# Patient Record
Sex: MALE | Race: WHITE | Employment: OTHER | URBAN - METROPOLITAN AREA
[De-identification: names, ages, dates, MRNs, and addresses within clinical notes are randomized per-mention and may not be internally consistent; named-entity substitution may affect disease eponyms.]

---

## 2017-01-01 ENCOUNTER — MEDICAL CORRESPONDENCE (OUTPATIENT)
Dept: HEALTH INFORMATION MANAGEMENT | Facility: CLINIC | Age: 77
End: 2017-01-01

## 2017-01-01 ENCOUNTER — TELEPHONE (OUTPATIENT)
Dept: FAMILY MEDICINE | Facility: OTHER | Age: 77
End: 2017-01-01

## 2017-01-01 ENCOUNTER — TELEPHONE (OUTPATIENT)
Dept: PULMONOLOGY | Facility: CLINIC | Age: 77
End: 2017-01-01

## 2017-01-01 DIAGNOSIS — E78.5 HYPERLIPIDEMIA LDL GOAL <100: ICD-10-CM

## 2017-01-01 DIAGNOSIS — R30.0 DYSURIA: ICD-10-CM

## 2017-01-01 DIAGNOSIS — N18.30 CKD (CHRONIC KIDNEY DISEASE) STAGE 3, GFR 30-59 ML/MIN (H): ICD-10-CM

## 2017-01-01 DIAGNOSIS — N40.0 HYPERTROPHY OF PROSTATE WITHOUT URINARY OBSTRUCTION: ICD-10-CM

## 2017-01-01 DIAGNOSIS — I50.9 CONGESTIVE HEART FAILURE, UNSPECIFIED CONGESTIVE HEART FAILURE CHRONICITY, UNSPECIFIED CONGESTIVE HEART FAILURE TYPE: ICD-10-CM

## 2017-01-01 DIAGNOSIS — J44.9 CHRONIC OBSTRUCTIVE PULMONARY DISEASE, UNSPECIFIED COPD TYPE (H): ICD-10-CM

## 2017-01-01 DIAGNOSIS — R30.0 DYSURIA: Primary | ICD-10-CM

## 2017-01-01 DIAGNOSIS — I10 HYPERTENSION GOAL BP (BLOOD PRESSURE) < 140/90: ICD-10-CM

## 2017-01-01 LAB
ALBUMIN UR-MCNC: 30 MG/DL
APPEARANCE UR: CLEAR
BACTERIA #/AREA URNS HPF: ABNORMAL /HPF
BILIRUB UR QL STRIP: NEGATIVE
COLOR UR AUTO: YELLOW
GLUCOSE UR STRIP-MCNC: NEGATIVE MG/DL
HGB UR QL STRIP: ABNORMAL
KETONES UR STRIP-MCNC: NEGATIVE MG/DL
LEUKOCYTE ESTERASE UR QL STRIP: ABNORMAL
NITRATE UR QL: NEGATIVE
PH UR STRIP: 7 PH (ref 5–7)
RBC #/AREA URNS AUTO: ABNORMAL /HPF
SOURCE: ABNORMAL
SP GR UR STRIP: 1.02 (ref 1–1.03)
UROBILINOGEN UR STRIP-ACNC: 0.2 EU/DL (ref 0.2–1)
WBC #/AREA URNS AUTO: ABNORMAL /HPF

## 2017-01-01 PROCEDURE — 81001 URINALYSIS AUTO W/SCOPE: CPT | Performed by: INTERNAL MEDICINE

## 2017-01-11 ENCOUNTER — TELEPHONE (OUTPATIENT)
Dept: PULMONOLOGY | Facility: CLINIC | Age: 77
End: 2017-01-11

## 2017-01-11 DIAGNOSIS — J44.9 CHRONIC OBSTRUCTIVE PULMONARY DISEASE, UNSPECIFIED COPD TYPE (H): Primary | ICD-10-CM

## 2017-01-11 RX ORDER — BUDESONIDE 1 MG/2ML
1 INHALANT ORAL 2 TIMES DAILY
Qty: 60 AMPULE | Refills: 3 | Status: SHIPPED
Start: 2017-01-11 | End: 2017-01-17

## 2017-01-11 NOTE — TELEPHONE ENCOUNTER
Reason for Call:  Medication or medication refill:    Do you use a [facility]?  Name of the pharmacy and phone number for the current request[facility]    Name of the medication requested: budesonide (PULMICORT) 1 MG/2ML SUSP nebulizer solution     Other request: Pt calling and stated that [facility] have requested this multiple times and no response pt is needing this approved today please [facility] per pt per [facility] someone from the office should call. Thank You Fallon    Can we leave a detailed message on this number? YES    Phone number patient can be reached at: Cell number on file:    Telephone Information:   Mobile [phone]       Best Time: any    Call taken on 1/11/2017 at 8:30 AM by Fallon Carter

## 2017-01-11 NOTE — TELEPHONE ENCOUNTER
I have spoken with pt twice today. I have spoken to 2 different pharmacists at [facility] The last pharmacist wanted me to call the PA department. I have spoken to two different people from that department.  The original paper faxed communication from [facility] states that pt's insurance will not cover Pulmicort BID only QD. The first person I talked to in PA department did not say that pt needed a PA done. When I called the second person, they said that a PA could be done over the phone. They were given the COPD diagnosis, reasons for PA to exceed dose limit were no other medication to replace Pulmicort/pt does not tolerate daily dosing/pt does not tolerate less concentrated dose. The PA was denied for the 360ml quantity, that is for BID dosing. Pt and MD will receive a letter about this denial and the appeal process.  This will be brought to Dr. Goetz's attention on the 13th when he is here. ROVERTO Loaiza

## 2017-01-11 NOTE — TELEPHONE ENCOUNTER
PT needs refill of the Pulmicort neb. His insurance will only pay for 1 vial per day of the 1mg/2ml strength; they will only pay for 2 vials per day of the 0.5mg strength. I spoke to the [facility] pharmacist about this.  Pt tried the 0.5mg strength dose last fall and did not well with this.  Pt has difficulty understanding that the amt per day is the problem with the strength he is on. States he has not tried any other neb meds and that he needs the steroid!  I told pt that I would contact Dr. Goetz today. The refill has to be for a 90 day supply. ROVERTO Loaiza

## 2017-01-13 NOTE — TELEPHONE ENCOUNTER
Per Dr. Goetz he has signed the prescription for this medication and since Alicia has done the PA and it was denied there is not much else we can do from our end.    1) [facility] can [facility] and verify that they received the script.  2) Wesee if they will let him pay for the additional medication.  3) We can see if [facility] PA department was correct in thinking he could obtain the full script monthly and let them contact him with that option.  4) [facility]    I will attempt to contact [facility] today with these questions.

## 2017-01-13 NOTE — TELEPHONE ENCOUNTER
Called [facility], Spoke with Vika then with Jeff GARRIDO who is a pharmacist, to ask questions below  1) They do have the script on file from 1/11/17  2) They do not allow for cash payment from them.  They can send out the 90 day quantity.  It is possible that he could get a second script from us and cash pay for it at a local pharmacy.  3) The PA department must have mis-spoke when they informed Alicia that he could get 120 ml of the 1mg/2ml dosage.  The plan limits to # 30 ampule (60 ml) in 30 days for the 1mg/2 ml dose.  Each lower dose is limited so that the max dose per day is 1mg of Pulmicort per day.  4) [facility].    I have attempted to contact this patient by phone with the following results: no answer.

## 2017-01-16 NOTE — TELEPHONE ENCOUNTER
Spoke to patient and he states that he only has enough of his medication to get him through the next 5 days. He states that he needs to take this 2 times at day because by 3:00pm he can't breath. [place] try to have them fill the script for him. He also wonders if there is anything else that he can take? Patient doesn't seem to understand that it is his insurance that will not cover this medication and that we have done all that we can do on our end.

## 2017-01-16 NOTE — TELEPHONE ENCOUNTER
I have asked staff to page Dr. Goetz and ask him to call Ion to see if there is any other options for him.

## 2017-01-17 DIAGNOSIS — J43.2 CENTRILOBULAR EMPHYSEMA (H): Primary | ICD-10-CM

## 2017-01-17 RX ORDER — BUDESONIDE 0.5 MG/2ML
0.5 INHALANT ORAL DAILY
Qty: 30 AMPULE | Refills: 6 | Status: SHIPPED | OUTPATIENT
Start: 2017-01-17 | End: 2017-04-17

## 2017-01-17 NOTE — PROGRESS NOTES
Patient called and informed that insurance will not pay for pulmicort 1 mg BID dosing. I offered alternative of 0.5 mg BID plus an oral prednisone dose of 5-10 mg which shouldn't have much adverse effects.  He'll try the lower dose without the extra prednisone unless he needs it.  He's currently in [place] for the winter and is doing well in the warmer weather.

## 2017-01-18 ENCOUNTER — TELEPHONE (OUTPATIENT)
Dept: OTOLARYNGOLOGY | Facility: CLINIC | Age: 77
End: 2017-01-18

## 2017-01-18 NOTE — TELEPHONE ENCOUNTER
Reason for Call:  Medication or medication refill:    Do you use a [facility]?  Name of the pharmacy and phone [facility] for the [facility]    Name of the medication requested: medication you spoke with him about yesterday.    Other request: he says it is still prescription is still incorrect and would like a phone call to get it right.    Can we leave a detailed message on this number? YES    Phone number patient can be reached at: Cell number on file:    Telephone Information:   Mobile [phone]       Best Time: asap    Call taken on 1/18/2017 at 8:48 AM by Rosenda Jameson

## 2017-01-19 NOTE — TELEPHONE ENCOUNTER
Patient called again today.  Asked to speak with Lakesha.  States insurance will only pay for medication once a day and Dr Goetz has prescribed twice a day. PULMICORT

## 2017-01-24 NOTE — TELEPHONE ENCOUNTER
The Dr. Goetz placed a new prescription in for Pulmicort 0.5 mg twice a day after speaking with Ion on 1/17/17 this was sent to [facility] per Ion's request.  I will attempt to contact [facility] to verify this information today.   Dr. Goetz is here again on 1/27/17.

## 2017-01-27 NOTE — TELEPHONE ENCOUNTER
[facility]. They needed the dosage and how to dispense prescription. Writer gave the prescription information and script can be filled now. Attempted to call patient to inform him of this and the phone number has been disconnected.  Je HERNANDEZ CMA

## 2017-04-12 ENCOUNTER — TELEPHONE (OUTPATIENT)
Dept: FAMILY MEDICINE | Facility: OTHER | Age: 77
End: 2017-04-12

## 2017-04-12 NOTE — TELEPHONE ENCOUNTER
Reason for Call:  Medication or medication refill:    Do you use a [facility]?  Name of the pharmacy and phone number for the [facility]    Name of the medication requested: He just got back to [place] and has not been here since November. He would like a call back from Dr. Fontenot's nurse to update his medications.     Other request:     Can we leave a detailed message on this number? Not Applicable    Phone number patient can be reached at: Home number on file [phone] (home)    Best Time: any    Call taken on 4/12/2017 at 2:37 PM by Johanne Gill

## 2017-04-12 NOTE — TELEPHONE ENCOUNTER
Mercedes stopped at the clinic and dropped off a list and it has been placed in Dr Fontenot's box.  He needs his prescriptions to go to [facility].  She was notified that Dr GARRIDO is out of clinic until 4/18/17, she will notify Rupert of this and will have him call if he needs anything urgently.    Thank you,  Ana TREVINO

## 2017-04-26 ENCOUNTER — OFFICE VISIT (OUTPATIENT)
Dept: FAMILY MEDICINE | Facility: OTHER | Age: 77
End: 2017-04-26
Payer: MEDICARE

## 2017-04-26 VITALS
HEART RATE: 88 BPM | SYSTOLIC BLOOD PRESSURE: 122 MMHG | OXYGEN SATURATION: 99 % | RESPIRATION RATE: 26 BRPM | BODY MASS INDEX: 22.53 KG/M2 | WEIGHT: 135.2 LBS | DIASTOLIC BLOOD PRESSURE: 82 MMHG | TEMPERATURE: 97.8 F | HEIGHT: 65 IN

## 2017-04-26 DIAGNOSIS — N18.30 CKD (CHRONIC KIDNEY DISEASE) STAGE 3, GFR 30-59 ML/MIN (H): ICD-10-CM

## 2017-04-26 DIAGNOSIS — I10 HYPERTENSION GOAL BP (BLOOD PRESSURE) < 140/90: ICD-10-CM

## 2017-04-26 DIAGNOSIS — F41.9 ANXIETY: ICD-10-CM

## 2017-04-26 DIAGNOSIS — I50.9 CONGESTIVE HEART FAILURE, UNSPECIFIED CONGESTIVE HEART FAILURE CHRONICITY, UNSPECIFIED CONGESTIVE HEART FAILURE TYPE: ICD-10-CM

## 2017-04-26 DIAGNOSIS — J44.9 CHRONIC OBSTRUCTIVE PULMONARY DISEASE, UNSPECIFIED COPD TYPE (H): ICD-10-CM

## 2017-04-26 DIAGNOSIS — J43.9 EMPHYSEMATOUS BLEB (H): ICD-10-CM

## 2017-04-26 DIAGNOSIS — Z13.29 SCREENING FOR THYROID DISORDER: Primary | ICD-10-CM

## 2017-04-26 DIAGNOSIS — N40.0 HYPERTROPHY OF PROSTATE WITHOUT URINARY OBSTRUCTION: ICD-10-CM

## 2017-04-26 DIAGNOSIS — E78.5 HYPERLIPIDEMIA LDL GOAL <100: ICD-10-CM

## 2017-04-26 LAB
ALBUMIN SERPL-MCNC: 4 G/DL (ref 3.4–5)
ALP SERPL-CCNC: 87 U/L (ref 40–150)
ALT SERPL W P-5'-P-CCNC: 21 U/L (ref 0–70)
ANION GAP SERPL CALCULATED.3IONS-SCNC: 8 MMOL/L (ref 3–14)
AST SERPL W P-5'-P-CCNC: 18 U/L (ref 0–45)
BILIRUB SERPL-MCNC: 0.5 MG/DL (ref 0.2–1.3)
BUN SERPL-MCNC: 31 MG/DL (ref 7–30)
CALCIUM SERPL-MCNC: 9.1 MG/DL (ref 8.5–10.1)
CHLORIDE SERPL-SCNC: 109 MMOL/L (ref 94–109)
CHOLEST SERPL-MCNC: 141 MG/DL
CO2 SERPL-SCNC: 28 MMOL/L (ref 20–32)
CREAT SERPL-MCNC: 1.28 MG/DL (ref 0.66–1.25)
CREAT UR-MCNC: 115 MG/DL
GFR SERPL CREATININE-BSD FRML MDRD: 54 ML/MIN/1.7M2
GLUCOSE SERPL-MCNC: 90 MG/DL (ref 70–99)
HDLC SERPL-MCNC: 57 MG/DL
LDLC SERPL CALC-MCNC: 72 MG/DL
MICROALBUMIN UR-MCNC: 204 MG/L
MICROALBUMIN/CREAT UR: 177.39 MG/G CR (ref 0–17)
NONHDLC SERPL-MCNC: 84 MG/DL
POTASSIUM SERPL-SCNC: 4.4 MMOL/L (ref 3.4–5.3)
PROT SERPL-MCNC: 7.3 G/DL (ref 6.8–8.8)
SODIUM SERPL-SCNC: 145 MMOL/L (ref 133–144)
TRIGL SERPL-MCNC: 58 MG/DL
TSH SERPL DL<=0.05 MIU/L-ACNC: 1.66 MU/L (ref 0.4–4)

## 2017-04-26 PROCEDURE — 82043 UR ALBUMIN QUANTITATIVE: CPT | Performed by: FAMILY MEDICINE

## 2017-04-26 PROCEDURE — 80053 COMPREHEN METABOLIC PANEL: CPT | Performed by: FAMILY MEDICINE

## 2017-04-26 PROCEDURE — 36415 COLL VENOUS BLD VENIPUNCTURE: CPT | Performed by: FAMILY MEDICINE

## 2017-04-26 PROCEDURE — 84443 ASSAY THYROID STIM HORMONE: CPT | Performed by: FAMILY MEDICINE

## 2017-04-26 PROCEDURE — 99214 OFFICE O/P EST MOD 30 MIN: CPT | Performed by: FAMILY MEDICINE

## 2017-04-26 PROCEDURE — 80061 LIPID PANEL: CPT | Performed by: FAMILY MEDICINE

## 2017-04-26 RX ORDER — LISINOPRIL 2.5 MG/1
2.5 TABLET ORAL 2 TIMES DAILY
Qty: 180 TABLET | Refills: 3 | Status: CANCELLED | OUTPATIENT
Start: 2017-04-26

## 2017-04-26 RX ORDER — TERAZOSIN 5 MG/1
CAPSULE ORAL
Qty: 90 CAPSULE | Refills: 3 | Status: SHIPPED | OUTPATIENT
Start: 2017-04-26 | End: 2017-07-31 | Stop reason: DRUGHIGH

## 2017-04-26 RX ORDER — LISINOPRIL 2.5 MG/1
2.5 TABLET ORAL DAILY
COMMUNITY
Start: 2016-09-14 | End: 2017-04-26

## 2017-04-26 RX ORDER — LISINOPRIL 5 MG/1
5 TABLET ORAL DAILY
Qty: 90 TABLET | Refills: 3 | Status: SHIPPED | OUTPATIENT
Start: 2017-04-26 | End: 2017-05-02

## 2017-04-26 RX ORDER — ARFORMOTEROL TARTRATE 15 UG/2ML
15 SOLUTION RESPIRATORY (INHALATION) 2 TIMES DAILY
COMMUNITY
Start: 2017-03-22 | End: 2017-04-26

## 2017-04-26 RX ORDER — BUDESONIDE 1 MG/2ML
1 INHALANT ORAL 2 TIMES DAILY
Qty: 360 ML | Refills: 3 | Status: SHIPPED | OUTPATIENT
Start: 2017-04-26

## 2017-04-26 RX ORDER — ATORVASTATIN CALCIUM 10 MG/1
10 TABLET, FILM COATED ORAL DAILY
Qty: 90 TABLET | Refills: 3 | Status: SHIPPED | OUTPATIENT
Start: 2017-04-26 | End: 2018-01-01

## 2017-04-26 RX ORDER — DILTIAZEM HYDROCHLORIDE 120 MG/1
120 TABLET, FILM COATED ORAL DAILY
Qty: 90 TABLET | Refills: 3 | Status: SHIPPED | OUTPATIENT
Start: 2017-04-26

## 2017-04-26 RX ORDER — ALPRAZOLAM 0.25 MG
0.25 TABLET ORAL 3 TIMES DAILY PRN
Qty: 90 TABLET | Refills: 0 | Status: SHIPPED | OUTPATIENT
Start: 2017-04-26

## 2017-04-26 RX ORDER — TIOTROPIUM BROMIDE 18 UG/1
CAPSULE ORAL; RESPIRATORY (INHALATION)
Qty: 90 CAPSULE | Refills: 3 | Status: SHIPPED | OUTPATIENT
Start: 2017-04-26

## 2017-04-26 RX ORDER — ARFORMOTEROL TARTRATE 15 UG/2ML
15 SOLUTION RESPIRATORY (INHALATION) 2 TIMES DAILY
Qty: 120 ML | Refills: 11 | Status: SHIPPED | OUTPATIENT
Start: 2017-04-26

## 2017-04-26 RX ORDER — LISINOPRIL 2.5 MG/1
2.5 TABLET ORAL DAILY
Qty: 90 TABLET | Refills: 3 | Status: SHIPPED | OUTPATIENT
Start: 2017-04-26 | End: 2017-05-02

## 2017-04-26 RX ORDER — SILDENAFIL 100 MG/1
100 TABLET, FILM COATED ORAL DAILY PRN
Qty: 10 TABLET | Refills: 11 | Status: SHIPPED | OUTPATIENT
Start: 2017-04-26 | End: 2018-01-01

## 2017-04-26 ASSESSMENT — PAIN SCALES - GENERAL: PAINLEVEL: NO PAIN (0)

## 2017-04-26 NOTE — LETTER
[facility]  [address] [facility] [address]  Phone: [phone]          April 26, 2017    Ion Johnson  [address] [place] [address]          Dear Ion,      LAB RESULTS:     The results of your recent labs were normal or have improved.  If you have any further questions or problems, please contact our office.          Sincerely,      MELISSA Fontenot M.D.

## 2017-04-26 NOTE — PROGRESS NOTES
"SUBJECTIVE:                                                    Ion Johnson is a 77 year old male who presents to clinic today for the following health issues:    Hyperlipidemia Follow-Up    Rate your low fat/cholesterol diet?: not monitoring fat    Taking statin?  Yes, no muscle aches from statin    Other lipid medications/supplements?:  none     Hypertension Follow-up    Outpatient blood pressures are being checked at home.  Results are good    Low Salt Diet: not monitoring salt     COPD Follow-Up    Symptoms are currently: slightly worsened    Current fatigue or dyspnea with ambulation: worsened from baseline    Shortness of breath: slightly worsened    Increased or change in Cough/Sputum: No    Fever(s): No    Baseline ambulation without stopping to rest 100 feet. Able to walk up 1 flights of stairs without stopping to rest.    Any ER/UC or hospital admissions since your last visit? No     History   Smoking Status     Former Smoker     Packs/day: 1.00     Years: 50.00     Quit date: 8/24/2007   Smokeless Tobacco     Never Used     Comment: Quit      No results found for: FEV1, NTY1PBT       Amount of exercise or physical activity: None    Problems taking medications regularly: No    Medication side effects: none    Diet: regular (no restrictions)      Problem list and histories reviewed & adjusted, as indicated.    C: NEGATIVE for fever, chills, change in weight  E/M: NEGATIVE for ear, mouth and throat problems  RESP:Hx COPD and SOB/dyspnea  CV: NEGATIVE for chest pain, palpitations or peripheral edema    OBJECTIVE:                                                    /82 (BP Location: Left arm, Cuff Size: Adult Regular)  Pulse 88  Temp 97.8  F (36.6  C) (Tympanic)  Resp 26  Ht 5' 5\" (1.651 m)  Wt 135 lb 3.2 oz (61.3 kg)  SpO2 99%  BMI 22.5 kg/m2  Body mass index is 22.5 kg/(m^2).    See dictated note     ASSESSMENT/PLAN:                                                        Scar Fontenot, " MD, MD  [facility]

## 2017-04-26 NOTE — NURSING NOTE
"Chief Complaint   Patient presents with     Hyperlipidemia     COPD     Hypertension       Initial /82 (BP Location: Left arm, Cuff Size: Adult Regular)  Pulse 88  Temp 97.8  F (36.6  C) (Tympanic)  Resp 26  Ht 5' 5\" (1.651 m)  Wt 135 lb 3.2 oz (61.3 kg)  SpO2 99%  BMI 22.5 kg/m2 Estimated body mass index is 22.5 kg/(m^2) as calculated from the following:    Height as of this encounter: 5' 5\" (1.651 m).    Weight as of this encounter: 135 lb 3.2 oz (61.3 kg).  Medication Reconciliation: complete   Michelle Avila MA     4/26/2017      "

## 2017-04-26 NOTE — MR AVS SNAPSHOT
"              After Visit Summary   4/26/2017    Ion Johnson    MRN: [id number]           Patient Information     Date Of Birth          1940        Visit Information        Provider Department      4/26/2017 8:00 AM Scar Fontenot MD [facility]        Today's Diagnoses     Screening for thyroid disorder    -  1    Hyperlipidemia LDL goal <100        Hypertension goal BP (blood pressure) < 140/90        Chronic obstructive pulmonary disease, unspecified COPD type (H)        Hypertrophy of prostate without urinary obstruction        Congestive heart failure, unspecified congestive heart failure chronicity, unspecified congestive heart failure type (H)        CKD (chronic kidney disease) stage 3, GFR 30-59 ml/min        Anxiety           Follow-ups after your visit        Who to contact     If you have questions or need follow up information about today's clinic visit or your schedule please contact [facility] directly at [phone].  Normal or non-critical lab and imaging results will be communicated to you by MyChart, letter or phone within 4 business days after the clinic has received the results. If you do not hear from us within 7 days, please contact the clinic through [facility] or phone. If you have a critical or abnormal lab result, we will notify you by phone as soon as possible.  Submit refill requests through [facility]n or call your pharmacy and they will forward the refill request to us. Please allow 3 business days for your refill to be completed.          Additional Information About Your Visit        MyChart Information     Rayn lets you send messages to your doctor, view your test results, renew your prescriptions, schedule appointments and more. To sign up, go to [email or web address] . Click on \"Log in\" on the left side of the screen, which will take you to the Welcome page. Then click on \"Sign up Now\" on the right side of the page.     You will be asked " "to enter the access code listed below, as well as some personal information. Please follow the directions to create your username and password.     Your access code is: [email or web address]  Expires: 2017  9:36 AM     Your access code will  in 90 days. If you need help or a new code, please call your [facility] clinic or [phone].        Care EveryWhere ID     This is your Care EveryWhere ID. This could be used by other organizations to access your [facility] medical records  [id number]        Your Vitals Were     Pulse Temperature Respirations Height Pulse Oximetry BMI (Body Mass Index)    88 97.8  F (36.6  C) (Tympanic) 26 5' 5\" (1.651 m) 99% 22.5 kg/m2       Blood Pressure from Last 3 Encounters:   17 122/82   16 118/70   16 152/82    Weight from Last 3 Encounters:   17 135 lb 3.2 oz (61.3 kg)   16 123 lb 1.6 oz (55.8 kg)   16 125 lb 9.6 oz (57 kg)              We Performed the Following     Albumin Random Urine Quantitative     Comprehensive metabolic panel     Lipid Profile with reflex to direct LDL     TSH          Today's Medication Changes          These changes are accurate as of: 17 10:05 AM.  If you have any questions, ask your nurse or doctor.               Start taking these medicines.        Dose/Directions    ALPRAZolam 0.25 MG tablet   Commonly known as:  XANAX   Used for:  Anxiety   Started by:  Scar Fontenot MD        Dose:  0.25 mg   Take 1 tablet (0.25 mg) by mouth 3 times daily as needed for anxiety   Quantity:  90 tablet   Refills:  0       * lisinopril 2.5 MG tablet   Commonly known as:  PRINIVIL/Zestril   Used for:  Hypertension goal BP (blood pressure) < 140/90   Started by:  Scar Fontenot MD        Dose:  2.5 mg   Take 1 tablet (2.5 mg) by mouth daily To take along with lisinopril 5 mg for total of lisinopril 7.5 mg twice daily   Quantity:  90 tablet   Refills:  3       * lisinopril 5 MG tablet   Commonly known as:  " PRINIVIL/ZESTRIL   Used for:  Hypertension goal BP (blood pressure) < 140/90   Started by:  Scar Fontenot MD        Dose:  5 mg   Take 1 tablet (5 mg) by mouth daily   Quantity:  90 tablet   Refills:  3       * Notice:  This list has 2 medication(s) that are the same as other medications prescribed for you. Read the directions carefully, and ask your doctor or other care provider to review them with you.         Where to get your medicines      These medications were sent to [facility] - [address] [address]     Phone:  [phone]     atorvastatin 10 MG tablet    Ipratropium-Albuterol  MCG/ACT inhaler    lisinopril 2.5 MG tablet    lisinopril 5 MG tablet    terazosin 5 MG capsule    tiotropium 18 MCG capsule         Some of these will need a paper prescription and others can be bought over the counter.  Ask your nurse if you have questions.     Bring a paper prescription for each of these medications     ALPRAZolam 0.25 MG tablet                Primary Care Provider Fax #    [facility] [phone]       [facility] [address]        Thank you!     Thank you for choosing [facility]  for your care. Our goal is always to provide you with excellent care. Hearing back from our patients is one way we can continue to improve our services. Please take a few minutes to complete the written survey that you may receive in the mail after your visit with us. Thank you!             Your Updated Medication List - Protect others around you: Learn how to safely use, store and throw away your medicines at www.disposemymeds.org.          This list is accurate as of: 4/26/17 10:06 AM.  Always use your most recent med list.                   Brand Name Dispense Instructions for use    ALPRAZolam 0.25 MG tablet    XANAX    90 tablet    Take 1 tablet (0.25 mg) by mouth 3 times daily as needed for  anxiety       atorvastatin 10 MG tablet    LIPITOR    90 tablet    Take 1 tablet (10 mg) by mouth daily       BROVANA 15 MCG/2ML Nebu neb solution   Generic drug:  arformoterol      Inhale 15 mcg into the lungs 2 times daily       budesonide 1 MG/2ML Susp neb solution    PULMICORT    360 mL    Take 2 mLs (1 mg) by nebulization 2 times daily       COQ-10 PO      Reported on 4/26/2017       diltiazem 120 MG tablet    CARDIZEM     1 tablet daily       * ipratropium - albuterol 0.5 mg/2.5 mg/3 mL 0.5-2.5 (3) MG/3ML neb solution    DUONEB    360 vial    USE 1 VIAL VIA NEBULIZER EVERY 4 HOURS AS NEEDED FOR SHORTNESS OF BREATH / DYSPNEA       * Ipratropium-Albuterol  MCG/ACT inhaler    COMBIVENT RESPIMAT    6 Inhaler    INHALE 1 PUFF INTO THE LUNGS FOUR TIMES A DAY. DO NOT EXCEED 6 DOSES PER DAY.       * lisinopril 2.5 MG tablet    PRINIVIL/Zestril    90 tablet    Take 1 tablet (2.5 mg) by mouth daily To take along with lisinopril 5 mg for total of lisinopril 7.5 mg twice daily       * lisinopril 5 MG tablet    PRINIVIL/ZESTRIL    90 tablet    Take 1 tablet (5 mg) by mouth daily       MULTI-VITAMIN PO      Reported on 4/26/2017       order for DME     1 each    Equipment being ordered: Portable Nebulizer machine (Omron Micro-Air Electronic Nebulizer System [id number]       order for DME     1 Device    Equipment being ordered: Oxygen with portability and all necessary accessories       sildenafil 100 MG cap/tab    VIAGRA    10 tablet    Take 1 tablet (100 mg) by mouth daily as needed       terazosin 5 MG capsule    HYTRIN    90 capsule    TAKE 1 CAPSULE AT BEDTIME       tiotropium 18 MCG capsule    SPIRIVA HANDIHALER    90 capsule    Inhale contents of one capsule daily.       * Notice:  This list has 4 medication(s) that are the same as other medications prescribed for you. Read the directions carefully, and ask your doctor or other care provider to review them with you.

## 2017-04-26 NOTE — PROGRESS NOTES
SUBJECTIVE:  Mr. Ion Johnson is a 77-year-old man who presents with his wife.        They have just returned from wintering in [place].  He has a problem with COPD, chronic lung problems followed by Pulmonology on pretty much everything for that.  Seems to think that that is stable or possibly even a little improved and that is his really only health complaint.  He does have some anxieties and needs periodic Xanax for that.  He needs refills on his medications and needs blood work today which is drawn.        I do give him refills.  Three months worth with a year's worth of refills except for the Xanax which is a scheduled drug and I explained that I suggest he gets that one locally and not get through the mail as they are not allowed to refill.  He seems to be upset about this, but I am adamant.      OBJECTIVE:    VITAL SIGNS:   On examination O2 sats now on room air are excellent at 99%.  Blood pressure is 122/82.  On his last visit here it had been very low and I suggested that he stop the lisinopril; however, he has not done so.  He continues to use that and without symptoms.  He did note when he used more lisinopril he got lightheaded.   LUNG:  Lungs today sound completely clear.   HEART:  Regular and no carotid bruits.      PLAN:    Will stay on current treatments.         TREMAINE NEGRETE M.D.             D: 2017 10:29   T: 2017 12:04   MT: BRYANT#[id number]      Name:     ION JOHNSON   MRN:      [id number]        Account:      [id number]   :      1940           Visit Date:   2017      Document: [id number]

## 2017-04-27 DIAGNOSIS — I10 HYPERTENSION GOAL BP (BLOOD PRESSURE) < 140/90: ICD-10-CM

## 2017-04-27 NOTE — TELEPHONE ENCOUNTER
Reason for Call:  Other prescription    Detailed comments: [facility] Pharmacist would like to speak to a nurse to verify both Lisinopril's.    Phone Number Patient can be reached at:     Best Time: anytime    Can we leave a detailed message on this number? YES    Call taken on 4/27/2017 at 11:49 AM by Nickie Goldsmith

## 2017-04-28 ENCOUNTER — TELEPHONE (OUTPATIENT)
Dept: PULMONOLOGY | Facility: CLINIC | Age: 77
End: 2017-04-28

## 2017-04-28 ENCOUNTER — TELEPHONE (OUTPATIENT)
Dept: FAMILY MEDICINE | Facility: OTHER | Age: 77
End: 2017-04-28

## 2017-04-28 NOTE — TELEPHONE ENCOUNTER
Patient called in with concerns about his Pulmicort Nebulizer solution. He states he spoke with [facility] and they told him he needed a PA in order to get the dosage and quanity he was requesting. He was upset and SOB when I spoke to him. He said he has enough to get him through for a while but wants to make sure when the new script comes that it is the right dosage. I reassured him that I would contact [facility] and speak with Dr. Goetz and try to get him an answer by the end of the day today. He also informed me [facility] but doesn't want to [facility] there anymore.    I spoke with [facility] and they said he has reached his maximum quantity for the medication and this is now an insurance issue he needs to resolve. They have no recollection of telling the patient he could get a PA for that dosage of 1MG/2ML. They reassured me that a refill will be sent out on 5/2/2017 and he will get the max mL he can have which is 0.5MG/2ML. They told me that they could not release what other refills he had in their system or what strength.     Dr. Goetz agrees with [facility] and said he has told him previously on more than one occasion what his other options were and he denied all. [facility] would [facility]. He has reached his maximum with us and is not allowed anymore.    Discussed matter with Dr. Goetz and we decided the best solution is to write the patient a letter considering his level of agitation on the phone. The letter will be placed in the mail by end of the day today, 4/28/2017.    Paulina Shaffer, [facility]

## 2017-04-28 NOTE — LETTER
[address] [address]  [phone]      April 28, 2017      Ion JUAN Johnson  [address] [place] [id number]        Dear Ion,    After speaking with [facility] about your medication for Pulmicort 1MG/2ML nebulizer solution, we regret to inform you that we are still unable to process this request. Per your insurance policy, you have a quantity limit of 30 units per month. According to your insurance policy you are only covered for 1 MG per day. It seems you have reached your quantity limit. You have [facility] you [facility] 1MG/2ML solution, if so, we suggest you move forward with them for this prescription. At this point, there is nothing further we can do for you regarding this matter. This is an insurance issue. Attached you will find the letter we received from [employer] that explains the situation in further detail, you also should have received this.  If you have any questions or further concerns please feel free to contact us.      Sincerely,        Shad Goetz M.D.

## 2017-05-02 PROBLEM — F41.9 ANXIETY: Status: ACTIVE | Noted: 2017-05-02

## 2017-05-02 RX ORDER — LISINOPRIL 2.5 MG/1
2.5 TABLET ORAL 2 TIMES DAILY
Qty: 180 TABLET | Refills: 3 | Status: SHIPPED | OUTPATIENT
Start: 2017-05-02 | End: 2018-01-01 | Stop reason: DRUGHIGH

## 2017-05-02 RX ORDER — LISINOPRIL 5 MG/1
5 TABLET ORAL 2 TIMES DAILY
Qty: 180 TABLET | Refills: 3 | Status: SHIPPED | OUTPATIENT
Start: 2017-05-02 | End: 2018-01-01

## 2017-05-16 ENCOUNTER — TELEPHONE (OUTPATIENT)
Dept: PULMONOLOGY | Facility: CLINIC | Age: 77
End: 2017-05-16

## 2017-05-16 NOTE — TELEPHONE ENCOUNTER
"Received fax johanne from [facility] with letter from patient requesting reconsideration of his Pulmicort medication. The appeal was denied. This fax seems to be more of an \"FYI\" purpose. They will be scanned and emailed to Dr. Goetz. Patient was informed this is an insurance issue at this point and \"our hands are tied\".    Paulina Shaffer CMA      "

## 2017-07-26 ENCOUNTER — TELEPHONE (OUTPATIENT)
Dept: PULMONOLOGY | Facility: CLINIC | Age: 77
End: 2017-07-26

## 2017-07-26 NOTE — TELEPHONE ENCOUNTER
None of his medications are given by Dr. Goetz. Also, weight loss issues would be more of an issue for his PCP. This is not geared for Pulmonology. Will forward to PCP for evaluation and appointment concerning weight loss.    Paulina Shaffer, CMA

## 2017-07-26 NOTE — TELEPHONE ENCOUNTER
Reason for Call:  Other call back    Detailed comments: Patient states he is having weight loss and believes it is due to the change in medications.  He has an appointment on August 25th but was hoping to speak with Dr Goetz or a nurse sooner if possible.      Phone Number Patient can be reached at: Home number on file [phone] (home)    Best Time: any    Can we leave a detailed message on this number? YES    Call taken on 7/26/2017 at 10:59 AM by Courtney Berg

## 2017-07-26 NOTE — TELEPHONE ENCOUNTER
": 1940  PHONE #'s: [phone] (home)     PRESENTING PROBLEM:  He is concerned that he has lost 18# since last year and down 2# from yesterday.     NURSING ASSESSMENT  Description:  \" I JUST CAN'T understand why I am losing weight?  I feel I eat well so wondering if it is my medication and what I can do about it?  I see Dr. Sue on 17.  Onset/duration:  This past year.   Precip. factors:  Hx of COPD taking Brovana, Pulmicort, Sprivia, Combivent- all of which  List side effect of upset stomach. \" I don't feel sick to my stomach though. \"   Assoc. Sx:  Some times will have a headache or feel trembling or nervous.   Improves/worsens Sx:  same  Pain scale (1-10)   0/10  Sx specific meds:  As listed above per pulmonologist.   Last exam/Tx:  17 saw Dr. Fontenot. [facility]  \" NO one can find out why I am losing weight. \"     RECOMMENDED DISPOSITION:  Schedule appt with Dr Fontenot for 17 and keep appt with Dr. Sue on 17.  Will comply with recommendation: YES  If further questions/concerns or if Sx do not improve, worsen or new Sx develop, call your PCP or [facility] as soon as possible.    NOTES:  Disposition was determined by the first positive assessment question, therefore all previous assessment questions were negative.  Informed to check provider manual or call insurance company to assure coverage.    Guideline used: Breathing problems. And Clinical References for the inhalers as listed above in the message.   Telephone Triage Protocols for Nurses, Fifth Edition, Ana Beltran RN  2017    "

## 2017-07-31 ENCOUNTER — OFFICE VISIT (OUTPATIENT)
Dept: FAMILY MEDICINE | Facility: OTHER | Age: 77
End: 2017-07-31
Payer: MEDICARE

## 2017-07-31 VITALS
HEART RATE: 60 BPM | BODY MASS INDEX: 20.39 KG/M2 | OXYGEN SATURATION: 94 % | SYSTOLIC BLOOD PRESSURE: 185 MMHG | HEIGHT: 65 IN | WEIGHT: 122.4 LBS | TEMPERATURE: 98 F | DIASTOLIC BLOOD PRESSURE: 96 MMHG | RESPIRATION RATE: 24 BRPM

## 2017-07-31 DIAGNOSIS — I49.9 IRREGULAR HEART RHYTHM: ICD-10-CM

## 2017-07-31 DIAGNOSIS — N40.0 HYPERTROPHY OF PROSTATE WITHOUT URINARY OBSTRUCTION: Primary | ICD-10-CM

## 2017-07-31 DIAGNOSIS — J44.9 CHRONIC OBSTRUCTIVE PULMONARY DISEASE, UNSPECIFIED COPD TYPE (H): ICD-10-CM

## 2017-07-31 LAB
ERYTHROCYTE [DISTWIDTH] IN BLOOD BY AUTOMATED COUNT: 12.6 % (ref 10–15)
HCT VFR BLD AUTO: 43.7 % (ref 40–53)
HGB BLD-MCNC: 14.7 G/DL (ref 13.3–17.7)
MCH RBC QN AUTO: 30.1 PG (ref 26.5–33)
MCHC RBC AUTO-ENTMCNC: 33.6 G/DL (ref 31.5–36.5)
MCV RBC AUTO: 89 FL (ref 78–100)
PLATELET # BLD AUTO: 152 10E9/L (ref 150–450)
RBC # BLD AUTO: 4.89 10E12/L (ref 4.4–5.9)
WBC # BLD AUTO: 7.7 10E9/L (ref 4–11)

## 2017-07-31 PROCEDURE — 85027 COMPLETE CBC AUTOMATED: CPT | Performed by: FAMILY MEDICINE

## 2017-07-31 PROCEDURE — 99213 OFFICE O/P EST LOW 20 MIN: CPT | Performed by: FAMILY MEDICINE

## 2017-07-31 PROCEDURE — 36415 COLL VENOUS BLD VENIPUNCTURE: CPT | Performed by: FAMILY MEDICINE

## 2017-07-31 PROCEDURE — 93000 ELECTROCARDIOGRAM COMPLETE: CPT | Performed by: FAMILY MEDICINE

## 2017-07-31 RX ORDER — TERAZOSIN 2 MG/1
2 CAPSULE ORAL AT BEDTIME
Qty: 30 CAPSULE | Refills: 1 | Status: SHIPPED | OUTPATIENT
Start: 2017-07-31

## 2017-07-31 ASSESSMENT — PAIN SCALES - GENERAL: PAINLEVEL: NO PAIN (0)

## 2017-07-31 NOTE — LETTER
My COPD Action Plan   Name: Ion Johnson    YOB: 1940   Date: 7/31/2017    My doctor: Scar Fontenot MD, MD   My clinic: [address] [address] [phone]  My Controller Medicine: Tiotropium (Spiriva)   Brovona  Budesonide (Pulmicort)        My Rescue Medicine: combivent        My Flare Up Medicine: Prednisone     My COPD Severity:     Use of Oxygen: 2-3 Liters continuously        GREEN ZONE       Doing well today      Usual level of activity and exercise    Usual amount of cough and mucus    No shortness of breath    Usual level of health (thinking clearly, sleeping well, feel like eating) Actions:      Take daily medicines    Use oxygen as prescribed    Follow regular exercise and diet plan    Avoid cigarette smoke and other irritants that harm the lungs           YELLOW ZONE          Having a bad day or flare up      Short of breath more than usual    A lot more sputum (mucus) than usual    Sputum looks yellow, green, tan, brown or bloody    More coughing or wheezing    Fever or chills    Less energy; trouble completing activities    Trouble thinking or focusing    Using quick relief inhaler or nebulizer more often    Poor sleep; symptoms wake me up    Do not feel like eating Actions:      Get plenty of rest    Take daily medicines    Use quick relief inhaler every 4  hoursas needed    If you use oxygen, call you doctor to see if you should adjust your oxygen    Do breathing exercises or other things to help you relax    Let a loved one, friend or neighbor know you are feeling worse    Call your care team if you have 2 or more symptoms.  Start taking steroids or antibiotics if directed by your care team           RED ZONE       Need medical care now      Severe shortness of breath (feel you can't breathe)    Fever, chills    Not enough breath to do any activity    Trouble coughing up mucus, walking or talking    Blood in mucus    Frequent  coughing   Rescue medicines are not working    Not able to sleep because of breathing    Feel confused or drowsy    Chest pain    Actions:      Call your health care team.  If you cannot reach your care team, call 911 or go to the emergency room.        Electronically signed by: Marcela Palm, July 31, 2017  Annual Reminders:  Meet with Care Team, Flu Shot every Fall and Pneumonia Shot at least once  Pharmacy:    [facility] - [place] [address]  [facility] [address]  [facility] FOR MAIL[facility] ONLY - [place], [place]  [facility] - [facility], [place] - [address] [facility] [address]

## 2017-07-31 NOTE — PROGRESS NOTES
"SUBJECTIVE:                                                    Ion Johnson is a 77 year old male who presents to clinic today for the following health issues:    Chief Complaint   Patient presents with     Weight Loss         Problem list and histories reviewed & adjusted, as indicated.    C: Continued wt loss  E/M: NEGATIVE for ear, mouth and throat problems  RESP:dyspnea on exertion, Hx COPD and SOB/dyspnea,on oxygen prn and at night  CV: NEGATIVE for chest pain, palpitations or peripheral edema  CV: low bp in ams and Hx HTN    OBJECTIVE:                                                    BP (!) 178/96  Pulse 65  Temp 98  F (36.7  C) (Temporal)  Resp 24  Ht 5' 5\" (1.651 m)  Wt 122 lb 6.4 oz (55.5 kg)  SpO2 94%  BMI 20.37 kg/m2  Body mass index is 20.37 kg/(m^2).         ASSESSMENT/PLAN:                                                    Worsening copd,progressive severe wt loss. Suggst otc protien supplement, reduce hs terazosin from 5 to 2 mg.Has irregular heart beat - EKG done and is sinus with PVCs  Scar Fontenot MD, MD  [facility]          "

## 2017-07-31 NOTE — LETTER
My Heart Failure Action Plan   Name: Ion Johnson    YOB: 1940   Date: 7/31/2017    My doctor: [address] [address] [phone]  My Diagnosis:   My Ejection Fraction:     %    My Exercise Goal: 30 minutes daily  .     My Weight Goal:   Wt Readings from Last 2 Encounters:   07/31/17 122 lb 6.4 oz (55.5 kg)   04/26/17 135 lb 3.2 oz (61.3 kg)     Weigh yourself daily using the same scale. If you gain more than 2 pounds in 24 hours or 5 pounds in a week increase your diuretic to     My Diet Goal:     Emergency Room Visits:    Our goal is to improve your quality of life and help you avoid a visit to the emergency room or hospital.  If we work together, we can achieve this goal. But, if you feel you need to call 911 or go to the emergency room, please do so.  If you go to the emergency room, please bring your list of medicines and your daily weight chart with you.       GREEN ZONE     Doing well today    Weight gained is no more than 2 pounds a day or 5 pounds a week.    No swelling in feet, ankles, legs or stomach.    No more swelling than usual.    No more trouble breathing than usual.    No change in my sleep.    No other problems. Actions:    I am doing fine.  I will take my medicine, follow my diet, see my doctor, exercise, and watch for symptoms.           YELLOW ZONE         Having a bad day or flare up    Weight gain of more than 2 pounds in one day or 5 pounds in one week.    New swelling in ankle, leg, knee or thigh.    Bloating in belly, pants feel tighter.    Swelling in hands or face.    Coughing or trouble breathing while walking or talking.    Harder to breathe last night.    Have trouble sleeping, wake up short of breath.    Much more tired than usual.    Not eating.    Pain in my chest or bad leg cramps.    Feel weak or dizzy. Actions:    I need to take action and call my doctor or nurse today.                  RED ZONE         Need medical care now    Weight gain of 5 pounds overnight.    Chest pain or pressure that does not go away.    Feel less alert.    Wheezing or have trouble breathing when at rest.    Cannot sleep lying down.    Cannot take my water pill.    Pass out or faint. Actions:    I need to call my doctor or nurse now!    Call 911 if I have chest pain or cannot breathe.        Electronically signed by: Marcela Palm, July 31, 2017

## 2017-07-31 NOTE — MR AVS SNAPSHOT
"              After Visit Summary   7/31/2017    Ion Johnson    MRN: [id number]           Patient Information     Date Of Birth          1940        Visit Information        Provider Department      7/31/2017 2:00 PM Scar Fontenot MD [facility]        Today's Diagnoses     Hypertrophy of prostate without urinary obstruction    -  1    Irregular heart rhythm        Chronic obstructive pulmonary disease, unspecified COPD type (H)           Follow-ups after your visit        Your next 10 appointments already scheduled     Aug 25, 2017  2:30 PM CDT   Return Visit with Shad Goetz MD   [facility] ([facility])    [address] [place] [address] [phone]              Who to contact     If you have questions or need follow up information about today's clinic visit or your schedule please contact [facility] directly at [phone].  Normal or non-critical lab and imaging results will be communicated to you by MyChart, letter or phone within 4 business days after the clinic has received the results. If you do not hear from us within 7 days, please contact the clinic through MyChart or phone. If you have a critical or abnormal lab result, we will notify you by phone as soon as possible.  Submit refill requests through [employer] or call your pharmacy and they will forward the refill request to us. Please allow 3 business days for your refill to be completed.          Additional Information About Your Visit        MyChart Information     [employer] lets you send messages to your doctor, view your test results, renew your prescriptions, schedule appointments and more. To sign up, go to [email or web address] . Click on \"Log in\" on the left side of the screen, which will take you to the Welcome page. Then click on \"Sign up Now\" on the right side of the page.     You will be asked to enter the access code listed below, as well as " "some personal information. Please follow the directions to create your username and password.     Your access code is: [email or web address]  Expires: 10/29/2017  3:53 PM     Your access code will  in 90 days. If you need help or a new code, please call your [facility] clinic or [phone].        Care EveryWhere ID     This is your Care EveryWhere ID. This could be used by other organizations to access your [facility] medical records  [id number]        Your Vitals Were     Pulse Temperature Respirations Height Pulse Oximetry BMI (Body Mass Index)    60 98  F (36.7  C) (Temporal) 24 5' 5\" (1.651 m) 94% 20.37 kg/m2       Blood Pressure from Last 3 Encounters:   17 (!) 185/96   17 122/82   16 118/70    Weight from Last 3 Encounters:   17 122 lb 6.4 oz (55.5 kg)   17 135 lb 3.2 oz (61.3 kg)   16 123 lb 1.6 oz (55.8 kg)              We Performed the Following     CBC with platelets     COPD ACTION PLAN     EKG 12-lead complete w/read - Clinics     HEART FAILURE ACTION PLAN          Today's Medication Changes          These changes are accurate as of: 17  3:53 PM.  If you have any questions, ask your nurse or doctor.               These medicines have changed or have updated prescriptions.        Dose/Directions    terazosin 2 MG capsule   Commonly known as:  HYTRIN   This may have changed:    - medication strength  - how much to take  - how to take this  - when to take this  - additional instructions   Used for:  Hypertrophy of prostate without urinary obstruction   Changed by:  Scar Fontenot MD        Dose:  2 mg   Take 1 capsule (2 mg) by mouth At Bedtime   Quantity:  30 capsule   Refills:  1            Where to get your medicines      These medications were sent to [id number] - [place], [place] - [address] [place] [address]  [address] [place] [address] [facility] [address]    Hours:  M-F 8:30-6:30; Sat 9-4; closed  Phone:  [phone]     terazosin 2 MG capsule                Primary " Care Provider Fax #    [facility] [phone]       [address] [facility] [address]        Equal Access to Services     KYLAH ROGER : Hadii aad ku hadsarylaquita Gramajo, walolada luqadaha, qaybta kaalmada samanthamarcosmarvin, nancy guardado saritahardeep hungmary kaydoyle sanchez. So [facility] [phone].    ATENCIÓN: Si habla español, tiene a john disposición servicios gratuitos de asistencia lingüística. Llame al [phone].    We comply with applicable federal civil rights laws and [place] laws. We do not discriminate on the basis of race, color, national origin, age, disability sex, sexual orientation or gender identity.            Thank you!     Thank you for choosing [facility]  for your care. Our goal is always to provide you with excellent care. Hearing back from our patients is one way we can continue to improve our services. Please take a few minutes to complete the written survey that you may receive in the mail after your visit with us. Thank you!             Your Updated Medication List - Protect others around you: Learn how to safely use, store and throw away your medicines at www.disposemymeds.org.          This list is accurate as of: 7/31/17  3:53 PM.  Always use your most recent med list.                   Brand Name Dispense Instructions for use Diagnosis    ALPRAZolam 0.25 MG tablet    XANAX    90 tablet    Take 1 tablet (0.25 mg) by mouth 3 times daily as needed for anxiety    Anxiety       atorvastatin 10 MG tablet    LIPITOR    90 tablet    Take 1 tablet (10 mg) by mouth daily    Hyperlipidemia LDL goal <100, Hypertension goal BP (blood pressure) < 140/90, Chronic obstructive pulmonary disease, unspecified COPD type (H), Hypertrophy of prostate without urinary obstruction, Congestive heart failure, unspecified congestive heart failure chronicity, unspecified congestive heart failure type (H), CKD (chronic kidney disease) stage 3, GFR 30-59 ml/min       BROVANA 15 MCG/2ML Nebu neb  solution   Generic drug:  arformoterol     120 mL    Take 2 mLs (15 mcg) by nebulization 2 times daily    Chronic obstructive pulmonary disease, unspecified COPD type (H)       budesonide 1 MG/2ML Susp neb solution    PULMICORT    360 mL    Take 2 mLs (1 mg) by nebulization 2 times daily    Emphysematous bleb (H)       COQ-10 PO      Reported on 4/26/2017        diltiazem 120 MG tablet    CARDIZEM    90 tablet    Take 1 tablet (120 mg) by mouth daily 1 tablet daily    Congestive heart failure, unspecified congestive heart failure chronicity, unspecified congestive heart failure type (H), Hypertension goal BP (blood pressure) < 140/90       * ipratropium - albuterol 0.5 mg/2.5 mg/3 mL 0.5-2.5 (3) MG/3ML neb solution    DUONEB    360 vial    USE 1 VIAL VIA NEBULIZER EVERY 4 HOURS AS NEEDED FOR SHORTNESS OF BREATH / DYSPNEA    Chronic airway obstruction, not elsewhere classified       * Ipratropium-Albuterol  MCG/ACT inhaler    COMBIVENT RESPIMAT    6 Inhaler    INHALE 1 PUFF INTO THE LUNGS FOUR TIMES A DAY. DO NOT EXCEED 6 DOSES PER DAY.    Hyperlipidemia LDL goal <100, Hypertension goal BP (blood pressure) < 140/90, Chronic obstructive pulmonary disease, unspecified COPD type (H), Hypertrophy of prostate without urinary obstruction, Congestive heart failure, unspecified congestive heart failure chronicity, unspecified congestive heart failure type (H), CKD (chronic kidney disease) stage 3, GFR 30-59 ml/min       * lisinopril 2.5 MG tablet    PRINIVIL/Zestril    180 tablet    Take 1 tablet (2.5 mg) by mouth 2 times daily To take along with lisinopril 5 mg for total of lisinopril 7.5 mg twice daily    Hypertension goal BP (blood pressure) < 140/90       * lisinopril 5 MG tablet    PRINIVIL/ZESTRIL    180 tablet    Take 1 tablet (5 mg) by mouth 2 times daily    Hypertension goal BP (blood pressure) < 140/90       MULTI-VITAMIN PO      Reported on 4/26/2017        order for DME     1 each    Equipment being  ordered: Portable Nebulizer machine (Omron Micro-Air Electronic Nebulizer System [id number]    Chronic airway obstruction, not elsewhere classified       order for DME     1 Device    Equipment being ordered: Oxygen with portability and all necessary accessories    Congestive heart failure, unspecified congestive heart failure chronicity, unspecified congestive heart failure type (H), Chronic obstructive pulmonary disease, unspecified COPD type (H)       sildenafil 100 MG cap/tab    VIAGRA    10 tablet    Take 1 tablet (100 mg) by mouth daily as needed    Hyperlipidemia LDL goal <100, Hypertension goal BP (blood pressure) < 140/90, Chronic obstructive pulmonary disease, unspecified COPD type (H), Hypertrophy of prostate without urinary obstruction, Congestive heart failure, unspecified congestive heart failure chronicity, unspecified congestive heart failure type (H), CKD (chronic kidney disease) stage 3, GFR 30-59 ml/min       terazosin 2 MG capsule    HYTRIN    30 capsule    Take 1 capsule (2 mg) by mouth At Bedtime    Hypertrophy of prostate without urinary obstruction       tiotropium 18 MCG capsule    SPIRIVA HANDIHALER    90 capsule    Inhale contents of one capsule daily.    Chronic obstructive pulmonary disease, unspecified COPD type (H)       * Notice:  This list has 4 medication(s) that are the same as other medications prescribed for you. Read the directions carefully, and ask your doctor or other care provider to review them with you.

## 2017-08-25 ENCOUNTER — OFFICE VISIT (OUTPATIENT)
Dept: PULMONOLOGY | Facility: CLINIC | Age: 77
End: 2017-08-25
Payer: MEDICARE

## 2017-08-25 VITALS
BODY MASS INDEX: 20.71 KG/M2 | RESPIRATION RATE: 20 BRPM | TEMPERATURE: 96.7 F | HEART RATE: 117 BPM | WEIGHT: 124.3 LBS | HEIGHT: 65 IN | OXYGEN SATURATION: 90 %

## 2017-08-25 DIAGNOSIS — J43.2 CENTRILOBULAR EMPHYSEMA (H): Primary | ICD-10-CM

## 2017-08-25 PROCEDURE — 99214 OFFICE O/P EST MOD 30 MIN: CPT | Performed by: INTERNAL MEDICINE

## 2017-08-25 ASSESSMENT — PAIN SCALES - GENERAL: PAINLEVEL: NO PAIN (0)

## 2017-08-25 NOTE — PROGRESS NOTES
CC:   F/u severe COPD    Last visit 1 year ago.  About the same--short of breath with walking in store or doing light housework.,  Gets full easily with eating so has lost 10# but overall weight the same as it was 1 year ago.    Min  cough, minimal sputum, no hemoptysis.  Using Brovana, Combivent qid, got extra doses of budesonide from the [facility] so taking 1 mg BID plus spiriva.  No exacerbations requiring prednisone or ED visit.    Wearing O2 at night and sometimes during day but O2 saturations without O2 are usually 92% with brief dips to 85% with activity that quickly recover.    No LE edema, sleeping ok at night, no aspiration.,     Current Outpatient Prescriptions   Medication Sig Dispense Refill     terazosin (HYTRIN) 2 MG capsule Take 1 capsule (2 mg) by mouth At Bedtime 30 capsule 1     lisinopril (PRINIVIL/ZESTRIL) 2.5 MG tablet Take 1 tablet (2.5 mg) by mouth 2 times daily To take along with lisinopril 5 mg for total of lisinopril 7.5 mg twice daily 180 tablet 3     lisinopril (PRINIVIL/ZESTRIL) 5 MG tablet Take 1 tablet (5 mg) by mouth 2 times daily 180 tablet 3     atorvastatin (LIPITOR) 10 MG tablet Take 1 tablet (10 mg) by mouth daily 90 tablet 3     Ipratropium-Albuterol (COMBIVENT RESPIMAT)  MCG/ACT inhaler INHALE 1 PUFF INTO THE LUNGS FOUR TIMES A DAY. DO NOT EXCEED 6 DOSES PER DAY. 6 Inhaler 3     tiotropium (SPIRIVA HANDIHALER) 18 MCG capsule Inhale contents of one capsule daily. 90 capsule 3     ALPRAZolam (XANAX) 0.25 MG tablet Take 1 tablet (0.25 mg) by mouth 3 times daily as needed for anxiety 90 tablet 0     diltiazem (CARDIZEM) 120 MG tablet Take 1 tablet (120 mg) by mouth daily 1 tablet daily 90 tablet 3     budesonide (PULMICORT) 1 MG/2ML SUSP neb solution Take 2 mLs (1 mg) by nebulization 2 times daily 360 mL 3     sildenafil (VIAGRA) 100 MG cap/tab Take 1 tablet (100 mg) by mouth daily as needed 10 tablet 11     BROVANA 15 MCG/2ML NEBU neb solution Take 2 mLs (15 mcg) by  "nebulization 2 times daily 120 mL 11     order for DME Equipment being ordered: Oxygen with portability and all necessary accessories 1 Device 0     Coenzyme Q10 (COQ-10 PO) Reported on 4/26/2017       Placebo (ORDER FOR DME) Equipment being ordered: Portable Nebulizer machine ([employer] Micro-Air Electronic Nebulizer System [id number]   1 each 0     MULTI-VITAMIN OR Reported on 4/26/2017       [DISCONTINUED] ipratropium - albuterol 0.5 mg/2.5 mg/3 mL (DUONEB) 0.5-2.5 (3) MG/3ML nebulization USE 1 VIAL VIA NEBULIZER EVERY 4 HOURS AS NEEDED FOR SHORTNESS OF BREATH / DYSPNEA 360 vial 2         REVIEW OF SYSTEMS:  No fever, but mild  fatigue and mild anorexia. No abdominal pain, nausea or diarrhea.  RESPIRATORY EXAM:  Pulse 117  Temp 96.7  F (35.9  C) (Temporal)  Resp 20  Ht 5' 5\" (1.651 m)  Wt 124 lb 4.8 oz (56.4 kg)  SpO2 90%  BMI 20.68 kg/m2  O2 saturation 90% on room air   Moves easily to exam table without dyspnea  No jugular venous distention  No respiratory accessory muscle use. Thorax hyperinflated. Decreased but  clear breath sounds bilaterally.  Normal S1 and S2 heart sounds without murmur rub or gallop. No limb edema.  Abdomen non-distended  No digit cyanosis  No skin rash.   Affect and cognition are normal.    A: Severe COPD  ~ 20%  FEV1 but stable and no exacerbations.  Patient remains frustrated about inability to do more activity.  I recommended starting oral morphine prn for dyspnea but he didn't want to do that..  Told him and wife that there are no new medications that would benefit him beyond what his is now taking.   Up-to-date immunizations.  I don't think the weight loss is related to medications or a malignancy.    P: continue current medications and return prn.   Shad Goetz MD, MPH  Associate Professor of Medicine    "

## 2017-08-25 NOTE — MR AVS SNAPSHOT
"              After Visit Summary   2017    Ion Johnson    MRN: [id number]           Patient Information     Date Of Birth          1940        Visit Information        Provider Department      2017 2:30 PM Shad Goetz MD [facility]         Follow-ups after your visit        Who to contact     If you have questions or need follow up information about today's clinic visit or your schedule please contact [facility] directly at [phone].  Normal or non-critical lab and imaging results will be communicated to you by MyChart, letter or phone within 4 business days after the clinic has received the results. If you do not hear from us within 7 days, please contact the clinic through [employer] or phone. If you have a critical or abnormal lab result, we will notify you by phone as soon as possible.  Submit refill requests through [employer] or call your pharmacy and they will forward the refill request to us. Please allow 3 business days for your refill to be completed.          Additional Information About Your Visit        [facility] Information     [employer] lets you send messages to your doctor, view your test results, renew your prescriptions, schedule appointments and more. To sign up, go to [email or web address] . Click on \"Log in\" on the left side of the screen, which will take you to the Welcome page. Then click on \"Sign up Now\" on the right side of the page.     You will be asked to enter the access code listed below, as well as some personal information. Please follow the directions to create your username and password.     Your access code is: [email or web address]  Expires: 10/29/2017  3:53 PM     Your access code will  in 90 days. If you need help or a new code, please call your [facility] or [phone].        Care EveryWhere ID     This is your Care EveryWhere ID. This could be used by other organizations to access your [place] medical " "records  [id number]        Your Vitals Were     Pulse Temperature Respirations Height Pulse Oximetry BMI (Body Mass Index)    117 96.7  F (35.9  C) (Temporal) 20 5' 5\" (1.651 m) 90% 20.68 kg/m2       Blood Pressure from Last 3 Encounters:   07/31/17 (!) 185/96   04/26/17 122/82   07/05/16 118/70    Weight from Last 3 Encounters:   08/25/17 124 lb 4.8 oz (56.4 kg)   07/31/17 122 lb 6.4 oz (55.5 kg)   04/26/17 135 lb 3.2 oz (61.3 kg)              Today, you had the following     No orders found for display         Today's Medication Changes          These changes are accurate as of: 8/25/17  3:15 PM.  If you have any questions, ask your nurse or doctor.               These medicines have changed or have updated prescriptions.        Dose/Directions    Ipratropium-Albuterol  MCG/ACT inhaler   Commonly known as:  COMBIVENT RESPIMAT   This may have changed:  Another medication with the same name was removed. Continue taking this medication, and follow the directions you see here.   Used for:  Hyperlipidemia LDL goal <100, Hypertension goal BP (blood pressure) < 140/90, Chronic obstructive pulmonary disease, unspecified COPD type (H), Hypertrophy of prostate without urinary obstruction, Congestive heart failure, unspecified congestive heart failure chronicity, unspecified congestive heart failure type (H), CKD (chronic kidney disease) stage 3, GFR 30-59 ml/min   Changed by:  Scar Fontenot MD        INHALE 1 PUFF INTO THE LUNGS FOUR TIMES A DAY. DO NOT EXCEED 6 DOSES PER DAY.   Quantity:  6 Inhaler   Refills:  3                Primary Care Provider Fax #    [facility] [phone]       [address] [address]        Equal Access to Services     KYLAH ROGER AH: Frandy Gramajo, wanikole luqadaha, justyn kaalmada adeegyada, nancy sanchez. So [facility] [phone].    ATENCIÓN: Si habla español, tiene a john disposición servicios gratuitos de " asistencia lingüística. Rosa M al [phone].    We comply with applicable federal civil rights laws and [place] laws. We do not discriminate on the basis of race, color, national origin, age, disability sex, sexual orientation or gender identity.            Thank you!     Thank you for choosing [facility]  for your care. Our goal is always to provide you with excellent care. Hearing back from our patients is one way we can continue to improve our services. Please take a few minutes to complete the written survey that you may receive in the mail after your visit with us. Thank you!             Your Updated Medication List - Protect others around you: Learn how to safely use, store and throw away your medicines at www.disposemymeds.org.          This list is accurate as of: 8/25/17  3:15 PM.  Always use your most recent med list.                   Brand Name Dispense Instructions for use Diagnosis    ALPRAZolam 0.25 MG tablet    XANAX    90 tablet    Take 1 tablet (0.25 mg) by mouth 3 times daily as needed for anxiety    Anxiety       atorvastatin 10 MG tablet    LIPITOR    90 tablet    Take 1 tablet (10 mg) by mouth daily    Hyperlipidemia LDL goal <100, Hypertension goal BP (blood pressure) < 140/90, Chronic obstructive pulmonary disease, unspecified COPD type (H), Hypertrophy of prostate without urinary obstruction, Congestive heart failure, unspecified congestive heart failure chronicity, unspecified congestive heart failure type (H), CKD (chronic kidney disease) stage 3, GFR 30-59 ml/min       BROVANA 15 MCG/2ML Nebu neb solution   Generic drug:  arformoterol     120 mL    Take 2 mLs (15 mcg) by nebulization 2 times daily    Chronic obstructive pulmonary disease, unspecified COPD type (H)       budesonide 1 MG/2ML Susp neb solution    PULMICORT    360 mL    Take 2 mLs (1 mg) by nebulization 2 times daily    Emphysematous bleb (H)       COQ-10 PO      Reported on 4/26/2017        diltiazem 120  MG tablet    CARDIZEM    90 tablet    Take 1 tablet (120 mg) by mouth daily 1 tablet daily    Congestive heart failure, unspecified congestive heart failure chronicity, unspecified congestive heart failure type (H), Hypertension goal BP (blood pressure) < 140/90       Ipratropium-Albuterol  MCG/ACT inhaler    COMBIVENT RESPIMAT    6 Inhaler    INHALE 1 PUFF INTO THE LUNGS FOUR TIMES A DAY. DO NOT EXCEED 6 DOSES PER DAY.    Hyperlipidemia LDL goal <100, Hypertension goal BP (blood pressure) < 140/90, Chronic obstructive pulmonary disease, unspecified COPD type (H), Hypertrophy of prostate without urinary obstruction, Congestive heart failure, unspecified congestive heart failure chronicity, unspecified congestive heart failure type (H), CKD (chronic kidney disease) stage 3, GFR 30-59 ml/min       * lisinopril 2.5 MG tablet    PRINIVIL/Zestril    180 tablet    Take 1 tablet (2.5 mg) by mouth 2 times daily To take along with lisinopril 5 mg for total of lisinopril 7.5 mg twice daily    Hypertension goal BP (blood pressure) < 140/90       * lisinopril 5 MG tablet    PRINIVIL/ZESTRIL    180 tablet    Take 1 tablet (5 mg) by mouth 2 times daily    Hypertension goal BP (blood pressure) < 140/90       MULTI-VITAMIN PO      Reported on 4/26/2017        order for DME     1 each    Equipment being ordered: Portable Nebulizer machine (Omron Micro-Air Electronic Nebulizer System [id number]    Chronic airway obstruction, not elsewhere classified       order for DME     1 Device    Equipment being ordered: Oxygen with portability and all necessary accessories    Congestive heart failure, unspecified congestive heart failure chronicity, unspecified congestive heart failure type (H), Chronic obstructive pulmonary disease, unspecified COPD type (H)       sildenafil 100 MG cap/tab    VIAGRA    10 tablet    Take 1 tablet (100 mg) by mouth daily as needed    Hyperlipidemia LDL goal <100, Hypertension goal BP (blood pressure) <  140/90, Chronic obstructive pulmonary disease, unspecified COPD type (H), Hypertrophy of prostate without urinary obstruction, Congestive heart failure, unspecified congestive heart failure chronicity, unspecified congestive heart failure type (H), CKD (chronic kidney disease) stage 3, GFR 30-59 ml/min       terazosin 2 MG capsule    HYTRIN    30 capsule    Take 1 capsule (2 mg) by mouth At Bedtime    Hypertrophy of prostate without urinary obstruction       tiotropium 18 MCG capsule    SPIRIVA HANDIHALER    90 capsule    Inhale contents of one capsule daily.    Chronic obstructive pulmonary disease, unspecified COPD type (H)       * Notice:  This list has 2 medication(s) that are the same as other medications prescribed for you. Read the directions carefully, and ask your doctor or other care provider to review them with you.

## 2017-08-25 NOTE — NURSING NOTE
"Chief Complaint   Patient presents with     RECHECK     Pulmonary emphysema, unspecified emphysema type     Weight Loss       Initial Pulse 117  Temp 96.7  F (35.9  C) (Temporal)  Resp 20  Ht 5' 5\" (1.651 m)  Wt 124 lb 4.8 oz (56.4 kg)  SpO2 90%  BMI 20.68 kg/m2 Estimated body mass index is 20.68 kg/(m^2) as calculated from the following:    Height as of this encounter: 5' 5\" (1.651 m).    Weight as of this encounter: 124 lb 4.8 oz (56.4 kg).  Medication Reconciliation: complete     Paulina Shaffer CMA        "

## 2017-09-04 ENCOUNTER — MEDICAL CORRESPONDENCE (OUTPATIENT)
Dept: HEALTH INFORMATION MANAGEMENT | Facility: CLINIC | Age: 77
End: 2017-09-04

## 2017-09-05 DIAGNOSIS — N40.0 HYPERTROPHY OF PROSTATE WITHOUT URINARY OBSTRUCTION: ICD-10-CM

## 2017-09-05 NOTE — TELEPHONE ENCOUNTER
Reason for Call:  Medication or medication refill:    Do you use a [facility]?  Name of the pharmacy and phone number for the current request:  [facility]    Name of the medication requested: terazosin (HYTRIN) 2 MG capsule    Other request: FYI: Note from patient placed in provider mailbox regarding medication.   Can we leave a detailed message on this number? YES    Phone number patient can be reached at: Home number on file [phone] (home)    Best Time: any      Call taken on 9/5/2017 at 12:00 PM by Mckenna Hillman

## 2017-09-06 NOTE — TELEPHONE ENCOUNTER
Patient needs RX sent to [facility]. Last refill was sent to [facility].     Routing refill request to provider for review/approval because:  Labs out of range:  BP    Pam Ruano RN  [facility]

## 2017-09-06 NOTE — TELEPHONE ENCOUNTER
Hytrin         Last Written Prescription Date: 7/31/17  Last Fill Quantity: 30, # refills: 1    Last Office Visit with G, P or [facility] prescribing provider:  7/31/17   Future Office Visit:      BP Readings from Last 3 Encounters:   07/31/17 (!) 185/96   04/26/17 122/82   07/05/16 118/70

## 2017-09-11 RX ORDER — TERAZOSIN 2 MG/1
2 CAPSULE ORAL AT BEDTIME
Qty: 90 CAPSULE | Refills: 1 | OUTPATIENT
Start: 2017-09-11

## 2017-09-13 ENCOUNTER — TRANSFERRED RECORDS (OUTPATIENT)
Dept: HEALTH INFORMATION MANAGEMENT | Facility: CLINIC | Age: 77
End: 2017-09-13

## 2017-10-03 ENCOUNTER — TRANSFERRED RECORDS (OUTPATIENT)
Dept: HEALTH INFORMATION MANAGEMENT | Facility: CLINIC | Age: 77
End: 2017-10-03

## 2017-10-13 ENCOUNTER — ALLIED HEALTH/NURSE VISIT (OUTPATIENT)
Dept: FAMILY MEDICINE | Facility: OTHER | Age: 77
End: 2017-10-13
Payer: MEDICARE

## 2017-10-13 DIAGNOSIS — Z23 NEED FOR PROPHYLACTIC VACCINATION AND INOCULATION AGAINST INFLUENZA: Primary | ICD-10-CM

## 2017-10-13 PROCEDURE — G0008 ADMIN INFLUENZA VIRUS VAC: HCPCS

## 2017-10-13 PROCEDURE — 90662 IIV NO PRSV INCREASED AG IM: CPT

## 2017-10-13 NOTE — MR AVS SNAPSHOT
"              After Visit Summary   10/13/2017    Ion Johnson    MRN: [id number]           Patient Information     Date Of Birth          1940        Visit Information        Provider Department      10/13/2017 10:45 AM KELSEY RICE NURSE, [facility]        Today's Diagnoses     Need for prophylactic vaccination and inoculation against influenza    -  1       Follow-ups after your visit        Who to contact     If you have questions or need follow up information about today's clinic visit or your schedule please contact [facility] directly at [phone].  Normal or non-critical lab and imaging results will be communicated to you by [employer], letter or phone within 4 business days after the clinic has received the results. If you do not hear from us within 7 days, please contact the clinic through [employer] or phone. If you have a critical or abnormal lab result, we will notify you by phone as soon as possible.  Submit refill requests through [employer] or call your pharmacy and they will forward the refill request to us. Please allow 3 business days for your refill to be completed.          Additional Information About Your Visit        MyChart Information     [employer] lets you send messages to your doctor, view your test results, renew your prescriptions, schedule appointments and more. To sign up, go to [email or web address] . Click on \"Log in\" on the left side of the screen, which will take you to the Welcome page. Then click on \"Sign up Now\" on the right side of the page.     You will be asked to enter the access code listed below, as well as some personal information. Please follow the directions to create your username and password.     Your access code is: [email or web address]  Expires: 10/29/2017  3:53 PM     Your access code will  in 90 days. If you need help or a new code, please call your [facility] or [phone].        Care EveryWhere ID     This is your Care " EveryWhere ID. This could be used by other organizations to access your [place] medical records  [id number]         Blood Pressure from Last 3 Encounters:   07/31/17 (!) 185/96   04/26/17 122/82   07/05/16 118/70    Weight from Last 3 Encounters:   08/25/17 124 lb 4.8 oz (56.4 kg)   07/31/17 122 lb 6.4 oz (55.5 kg)   04/26/17 135 lb 3.2 oz (61.3 kg)              We Performed the Following     ADMIN INFLUENZA (For MEDICARE Patients ONLY) []     FLU VACCINE, INCREASED ANTIGEN, PRESV FREE, AGE 65+ [00797]        Primary Care Provider Fax #    [facility] [phone] [address] [address]        Equal Access to Services     KYLAH ROGER : Hadii katie wilhelmo Sozaid, waaxda luqadaha, qaybta kaalmada adetroyyamarvin, nancy an . So [facility] [place] [phone].    ATENCIÓN: Si habla español, tiene a john disposición servicios gratuitos de asistencia lingüística. Llame al [phone].    We comply with applicable federal civil rights laws and [place] laws. We do not discriminate on the basis of race, color, national origin, age, disability, sex, sexual orientation, or gender identity.            Thank you!     Thank you for choosing [facility]  for your care. Our goal is always to provide you with excellent care. Hearing back from our patients is one way we can continue to improve our services. Please take a few minutes to complete the written survey that you may receive in the mail after your visit with us. Thank you!             Your Updated Medication List - Protect others around you: Learn how to safely use, store and throw away your medicines at www.disposemymeds.org.          This list is accurate as of: 10/13/17 11:21 AM.  Always use your most recent med list.                   Brand Name Dispense Instructions for use Diagnosis    ALPRAZolam 0.25 MG tablet    XANAX    90 tablet    Take 1 tablet (0.25 mg) by mouth 3 times daily as needed  for anxiety    Anxiety       atorvastatin 10 MG tablet    LIPITOR    90 tablet    Take 1 tablet (10 mg) by mouth daily    Hyperlipidemia LDL goal <100, Hypertension goal BP (blood pressure) < 140/90, Chronic obstructive pulmonary disease, unspecified COPD type (H), Hypertrophy of prostate without urinary obstruction, Congestive heart failure, unspecified congestive heart failure chronicity, unspecified congestive heart failure type (H), CKD (chronic kidney disease) stage 3, GFR 30-59 ml/min       BROVANA 15 MCG/2ML Nebu neb solution   Generic drug:  arformoterol     120 mL    Take 2 mLs (15 mcg) by nebulization 2 times daily    Chronic obstructive pulmonary disease, unspecified COPD type (H)       budesonide 1 MG/2ML Susp neb solution    PULMICORT    360 mL    Take 2 mLs (1 mg) by nebulization 2 times daily    Emphysematous bleb (H)       COQ-10 PO      Reported on 4/26/2017        diltiazem 120 MG tablet    CARDIZEM    90 tablet    Take 1 tablet (120 mg) by mouth daily 1 tablet daily    Congestive heart failure, unspecified congestive heart failure chronicity, unspecified congestive heart failure type (H), Hypertension goal BP (blood pressure) < 140/90       Ipratropium-Albuterol  MCG/ACT inhaler    COMBIVENT RESPIMAT    6 Inhaler    INHALE 1 PUFF INTO THE LUNGS FOUR TIMES A DAY. DO NOT EXCEED 6 DOSES PER DAY.    Hyperlipidemia LDL goal <100, Hypertension goal BP (blood pressure) < 140/90, Chronic obstructive pulmonary disease, unspecified COPD type (H), Hypertrophy of prostate without urinary obstruction, Congestive heart failure, unspecified congestive heart failure chronicity, unspecified congestive heart failure type (H), CKD (chronic kidney disease) stage 3, GFR 30-59 ml/min       * lisinopril 2.5 MG tablet    PRINIVIL/Zestril    180 tablet    Take 1 tablet (2.5 mg) by mouth 2 times daily To take along with lisinopril 5 mg for total of lisinopril 7.5 mg twice daily    Hypertension goal BP (blood  pressure) < 140/90       * lisinopril 5 MG tablet    PRINIVIL/ZESTRIL    180 tablet    Take 1 tablet (5 mg) by mouth 2 times daily    Hypertension goal BP (blood pressure) < 140/90       MULTI-VITAMIN PO      Reported on 4/26/2017        order for DME     1 each    Equipment being ordered: Portable Nebulizer machine (Omron Micro-Air Electronic Nebulizer System [id number]    Chronic airway obstruction, not elsewhere classified       order for DME     1 Device    Equipment being ordered: Oxygen with portability and all necessary accessories    Congestive heart failure, unspecified congestive heart failure chronicity, unspecified congestive heart failure type (H), Chronic obstructive pulmonary disease, unspecified COPD type (H)       sildenafil 100 MG tablet    VIAGRA    10 tablet    Take 1 tablet (100 mg) by mouth daily as needed    Hyperlipidemia LDL goal <100, Hypertension goal BP (blood pressure) < 140/90, Chronic obstructive pulmonary disease, unspecified COPD type (H), Hypertrophy of prostate without urinary obstruction, Congestive heart failure, unspecified congestive heart failure chronicity, unspecified congestive heart failure type (H), CKD (chronic kidney disease) stage 3, GFR 30-59 ml/min       terazosin 2 MG capsule    HYTRIN    30 capsule    Take 1 capsule (2 mg) by mouth At Bedtime    Hypertrophy of prostate without urinary obstruction       tiotropium 18 MCG capsule    SPIRIVA HANDIHALER    90 capsule    Inhale contents of one capsule daily.    Chronic obstructive pulmonary disease, unspecified COPD type (H)       * Notice:  This list has 2 medication(s) that are the same as other medications prescribed for you. Read the directions carefully, and ask your doctor or other care provider to review them with you.

## 2017-10-13 NOTE — PROGRESS NOTES
Injectable Influenza Immunization Documentation    1.  Is the person to be vaccinated sick today?   No    2. Does the person to be vaccinated have an allergy to a component   of the vaccine?   No    3. Has the person to be vaccinated ever had a serious reaction   to influenza vaccine in the past?   No    4. Has the person to be vaccinated ever had Guillain-Barré syndrome?   No    Prior to injection verified patient identity using patient's name and date of birth.    Form completed by Michelle Avila MA     10/13/2017

## 2017-11-03 NOTE — TELEPHONE ENCOUNTER
Ion called in for his lab results.  I spoke with Dr Kennedy who told me to let the patient know he did have blood in his urine and really nothing else to be concerned about at this time.  He does recommend the patient follow up with his regular provider next week.  The patient stated that he expected the blood due to just having the surgery recently and was glad to hear he didn't have a bad infection or anything.    No other questions or concerns.    Thank you,  Ana TREVINO

## 2017-11-06 NOTE — TELEPHONE ENCOUNTER
"I am very sorry but that is not how it works.     I have not seen this patient nor do I know what kind of \"infection\" he will be having.     I would recommend that he follows up with a local physician [place] for his acute infectious needs.    Marcia"

## 2017-11-06 NOTE — TELEPHONE ENCOUNTER
Reason for Call:  Medication or medication refill:    Do you use a [facility]?  Name of the pharmacy and phone number for the current request:  [facility] - [phone] (Fax [phone])    Name of the medication requested: Z-ade    Other request: pt is requesting a Z-ade with refills. Pt will be out of stated for the winter. Wants to make sure he has refills if needed. Pt stated he has issues each year with URI. Would like sent to CM. Stated he will be establishing care with him.    Can we leave a detailed message on this number? YES    Phone number patient can be reached at: Home number on file [phone] (home)    Best Time:     Call taken on 11/6/2017 at 10:38 AM by Nickie Goldsmith

## 2017-11-14 NOTE — TELEPHONE ENCOUNTER
"Received apparent \"second request\" fax from [facility] regarding patient. Note states, \" Please send chart note between 9/31/16 and 12/31/16. Primary Dr said nebulizer medication was ordered through your office around that time.\"   According to pt chart, he does not have a primary doctor. There is also no encounter in pt chart stating they received such request originally and would be forwarding it on to Pulmonary. Pt was seen 8/5/2016 and 8/25/2017 were the times pt was seen. Pt also has been informed multiple times about the issues with his insurance and covering Pulmicort.   Attempted to contact Sandra at [facility] to get more information on what exactly they need and why but no answer. Will attempt call again later. Copy of fax will be sent to scanning.  Paulina Shaffer CMA      "

## 2017-11-14 NOTE — TELEPHONE ENCOUNTER
Attempted Sandra at [facility] again. No answer. Phone call routed to front. No answer. Will try again later.  Paulina Shaffer, CMA

## 2017-11-15 NOTE — TELEPHONE ENCOUNTER
"Spoke with Sandra, she clarified she just needs something discussing his O2 stats so insurance will cover it again. I told her what was noted in his chart from his most recent office visit on 8/25/2017, \"Wearing O2 at night and sometimes during day but O2 saturations without O2 are usually 92% with brief dips to 85% with activity that quickly recover.\" Sandra states, \"that will work.\" Will fax copy of office note and demographic sheet.  Paulina Shaffer [facility]      "

## 2017-11-29 NOTE — TELEPHONE ENCOUNTER
Received fax from [facility] with letter of medical necessity for oxygen. Provider in 12/1/2017 to complete forms.  Paulina Shaffer CMA

## 2017-12-01 NOTE — TELEPHONE ENCOUNTER
Forms completed and signed. Faxed back to [facility].  Paulina Shaffer, [facility]

## 2017-12-29 NOTE — TELEPHONE ENCOUNTER
Last Written Prescription Date:  4/26/17  Last Fill Quantity: 90,  # refills: 3   Last Office Visit with [facility], [facility] or [facility] prescribing provider:  7/31/17   Future Office Visit:       Last Written Prescription Date:  4/26/17  Last Fill Quantity: 6 inhaler,  # refills: 3   Last Office Visit with [facility], [facility] or [facility] prescribing provider:  7/31/17   Future Office Visit:       Requested Prescriptions   Pending Prescriptions Disp Refills     atorvastatin (LIPITOR) 10 MG tablet [Pharmacy Med Name: ATORVASTATIN TABS 10MG] 90 tablet 3     Sig: TAKE 1 TABLET DAILY    Statins Protocol Passed    12/29/2017 10:04 AM       Passed - LDL on file in past 12 months    Recent Labs   Lab Test  04/26/17   0938   LDL  72            Passed - No abnormal creatine kinase in past 12 months    No lab results found.         Passed - Recent or future visit with authorizing provider    Patient had office visit in the last year or has a visit in the next 30 days with authorizing provider.  See chart review.              Passed - Patient is age 18 or older        COMBIVENT RESPIMAT  MCG/ACT inhaler [Pharmacy Med Name: COMBIVENT RESPIMAT INHALER 4G 20/100] 24 g 3     Sig: USE 1 INHALATION FOUR TIMES A DAY, DO NOT EXCEED 6 INHALATIONS PER DAY    Asthma Maintenance Inhalers - Anticholinergics Passed    12/29/2017 10:04 AM       Passed - Patient is age 12 years or older       Passed - Recent or future visit with authorizing provider's specialty    Patient had office visit in the last year or has a visit in the next 30 days with authorizing provider.  See chart review.

## 2018-01-01 ENCOUNTER — HOSPITAL ENCOUNTER (OUTPATIENT)
Dept: CARDIOLOGY | Facility: CLINIC | Age: 78
Discharge: HOME | End: 2018-08-03
Attending: INTERNAL MEDICINE | Admitting: INTERNAL MEDICINE
Payer: MEDICARE

## 2018-01-01 ENCOUNTER — OFFICE VISIT (OUTPATIENT)
Dept: CARDIOLOGY | Facility: CLINIC | Age: 78
End: 2018-01-01
Attending: INTERNAL MEDICINE
Payer: MEDICARE

## 2018-01-01 ENCOUNTER — TELEPHONE (OUTPATIENT)
Dept: FAMILY MEDICINE | Facility: OTHER | Age: 78
End: 2018-01-01

## 2018-01-01 ENCOUNTER — TRANSFERRED RECORDS (OUTPATIENT)
Dept: HEALTH INFORMATION MANAGEMENT | Facility: CLINIC | Age: 78
End: 2018-01-01

## 2018-01-01 ENCOUNTER — HOSPITAL ENCOUNTER (EMERGENCY)
Facility: CLINIC | Age: 78
Discharge: ANOTHER HOSPITAL | End: 2018-10-26
Attending: EMERGENCY MEDICINE | Admitting: EMERGENCY MEDICINE
Payer: MEDICARE

## 2018-01-01 ENCOUNTER — HOSPITAL ENCOUNTER (OUTPATIENT)
Dept: CARDIOLOGY | Facility: CLINIC | Age: 78
Discharge: HOME | End: 2018-08-27
Attending: INTERNAL MEDICINE | Admitting: INTERNAL MEDICINE
Payer: MEDICARE

## 2018-01-01 ENCOUNTER — NURSE TRIAGE (OUTPATIENT)
Dept: NURSING | Facility: CLINIC | Age: 78
End: 2018-01-01

## 2018-01-01 ENCOUNTER — OFFICE VISIT (OUTPATIENT)
Dept: FAMILY MEDICINE | Facility: OTHER | Age: 78
End: 2018-01-01
Payer: MEDICARE

## 2018-01-01 ENCOUNTER — TELEPHONE (OUTPATIENT)
Dept: CARDIOLOGY | Facility: CLINIC | Age: 78
End: 2018-01-01

## 2018-01-01 ENCOUNTER — APPOINTMENT (OUTPATIENT)
Dept: GENERAL RADIOLOGY | Facility: CLINIC | Age: 78
End: 2018-01-01
Attending: EMERGENCY MEDICINE
Payer: MEDICARE

## 2018-01-01 ENCOUNTER — CARE COORDINATION (OUTPATIENT)
Dept: CARDIOLOGY | Facility: CLINIC | Age: 78
End: 2018-01-01

## 2018-01-01 ENCOUNTER — HOSPITAL ENCOUNTER (EMERGENCY)
Facility: CLINIC | Age: 78
Discharge: HOME | End: 2018-07-28
Attending: EMERGENCY MEDICINE | Admitting: EMERGENCY MEDICINE
Payer: MEDICARE

## 2018-01-01 ENCOUNTER — HOSPITAL ENCOUNTER (EMERGENCY)
Facility: CLINIC | Age: 78
Discharge: HOME | End: 2018-03-14
Attending: EMERGENCY MEDICINE | Admitting: EMERGENCY MEDICINE
Payer: MEDICARE

## 2018-01-01 VITALS
TEMPERATURE: 96.9 F | DIASTOLIC BLOOD PRESSURE: 64 MMHG | RESPIRATION RATE: 16 BRPM | HEART RATE: 100 BPM | WEIGHT: 121.9 LBS | SYSTOLIC BLOOD PRESSURE: 112 MMHG | OXYGEN SATURATION: 94 % | BODY MASS INDEX: 19.68 KG/M2

## 2018-01-01 VITALS
HEART RATE: 85 BPM | WEIGHT: 120 LBS | OXYGEN SATURATION: 99 % | RESPIRATION RATE: 22 BRPM | SYSTOLIC BLOOD PRESSURE: 137 MMHG | TEMPERATURE: 98.6 F | BODY MASS INDEX: 19.37 KG/M2 | DIASTOLIC BLOOD PRESSURE: 58 MMHG

## 2018-01-01 VITALS
RESPIRATION RATE: 24 BRPM | OXYGEN SATURATION: 98 % | DIASTOLIC BLOOD PRESSURE: 95 MMHG | HEART RATE: 64 BPM | SYSTOLIC BLOOD PRESSURE: 159 MMHG

## 2018-01-01 VITALS
WEIGHT: 131.6 LBS | OXYGEN SATURATION: 93 % | HEIGHT: 66 IN | HEART RATE: 84 BPM | TEMPERATURE: 98 F | SYSTOLIC BLOOD PRESSURE: 158 MMHG | DIASTOLIC BLOOD PRESSURE: 102 MMHG | BODY MASS INDEX: 21.15 KG/M2

## 2018-01-01 VITALS
DIASTOLIC BLOOD PRESSURE: 64 MMHG | OXYGEN SATURATION: 94 % | TEMPERATURE: 97.4 F | SYSTOLIC BLOOD PRESSURE: 108 MMHG | RESPIRATION RATE: 14 BRPM

## 2018-01-01 VITALS
WEIGHT: 120.8 LBS | HEIGHT: 66 IN | OXYGEN SATURATION: 94 % | SYSTOLIC BLOOD PRESSURE: 142 MMHG | HEART RATE: 78 BPM | DIASTOLIC BLOOD PRESSURE: 52 MMHG | BODY MASS INDEX: 19.41 KG/M2

## 2018-01-01 DIAGNOSIS — I49.5 SSS (SICK SINUS SYNDROME) (H): Primary | ICD-10-CM

## 2018-01-01 DIAGNOSIS — J43.1 PANLOBULAR EMPHYSEMA (H): ICD-10-CM

## 2018-01-01 DIAGNOSIS — R00.1 BRADYCARDIA: ICD-10-CM

## 2018-01-01 DIAGNOSIS — I42.9 SECONDARY CARDIOMYOPATHY (H): ICD-10-CM

## 2018-01-01 DIAGNOSIS — E87.5 HYPERKALEMIA: ICD-10-CM

## 2018-01-01 DIAGNOSIS — Z01.818 PREOP GENERAL PHYSICAL EXAM: Primary | ICD-10-CM

## 2018-01-01 DIAGNOSIS — C67.9 MALIGNANT NEOPLASM OF URINARY BLADDER, UNSPECIFIED SITE (H): ICD-10-CM

## 2018-01-01 DIAGNOSIS — R00.1 BRADYCARDIA: Primary | ICD-10-CM

## 2018-01-01 DIAGNOSIS — I10 HYPERTENSION GOAL BP (BLOOD PRESSURE) < 140/90: ICD-10-CM

## 2018-01-01 DIAGNOSIS — N18.30 CKD (CHRONIC KIDNEY DISEASE) STAGE 3, GFR 30-59 ML/MIN (H): ICD-10-CM

## 2018-01-01 DIAGNOSIS — I49.3 PVC'S (PREMATURE VENTRICULAR CONTRACTIONS): ICD-10-CM

## 2018-01-01 DIAGNOSIS — J44.9 CHRONIC OBSTRUCTIVE PULMONARY DISEASE, UNSPECIFIED COPD TYPE (H): ICD-10-CM

## 2018-01-01 DIAGNOSIS — R55 NEAR SYNCOPE: ICD-10-CM

## 2018-01-01 DIAGNOSIS — I49.5 SSS (SICK SINUS SYNDROME) (H): ICD-10-CM

## 2018-01-01 DIAGNOSIS — R94.39 ABNORMAL RESULT OF OTHER CARDIOVASCULAR FUNCTION STUDY: ICD-10-CM

## 2018-01-01 DIAGNOSIS — J96.02 ACUTE RESPIRATORY FAILURE WITH HYPERCAPNIA (H): ICD-10-CM

## 2018-01-01 DIAGNOSIS — R09.A2 FOREIGN BODY SENSATION IN THROAT: ICD-10-CM

## 2018-01-01 DIAGNOSIS — N17.9 ACUTE RENAL FAILURE, UNSPECIFIED ACUTE RENAL FAILURE TYPE (H): ICD-10-CM

## 2018-01-01 DIAGNOSIS — R00.1 SYMPTOMATIC BRADYCARDIA: Primary | ICD-10-CM

## 2018-01-01 DIAGNOSIS — I50.9 CONGESTIVE HEART FAILURE, UNSPECIFIED CONGESTIVE HEART FAILURE CHRONICITY, UNSPECIFIED CONGESTIVE HEART FAILURE TYPE: ICD-10-CM

## 2018-01-01 DIAGNOSIS — I47.20 V-TACH (H): Primary | ICD-10-CM

## 2018-01-01 DIAGNOSIS — R00.0 TACHYCARDIA: ICD-10-CM

## 2018-01-01 DIAGNOSIS — Z86.79 HISTORY OF CARDIOMYOPATHY: ICD-10-CM

## 2018-01-01 LAB
ALBUMIN SERPL-MCNC: 3.2 G/DL (ref 3.4–5)
ALBUMIN SERPL-MCNC: 4.1 G/DL (ref 3.4–5)
ALP SERPL-CCNC: 66 U/L (ref 40–150)
ALP SERPL-CCNC: 92 U/L (ref 40–150)
ALT SERPL W P-5'-P-CCNC: 21 U/L (ref 0–70)
ALT SERPL W P-5'-P-CCNC: 51 U/L (ref 0–70)
ANION GAP SERPL CALCULATED.3IONS-SCNC: 7 MMOL/L (ref 3–14)
ANION GAP SERPL CALCULATED.3IONS-SCNC: 8 MMOL/L (ref 3–14)
AST SERPL W P-5'-P-CCNC: 21 U/L (ref 0–45)
AST SERPL W P-5'-P-CCNC: 26 U/L (ref 0–45)
BASE DEFICIT BLDV-SCNC: 2.5 MMOL/L
BASE DEFICIT BLDV-SCNC: 3.1 MMOL/L
BASOPHILS # BLD AUTO: 0 10E9/L (ref 0–0.2)
BASOPHILS NFR BLD AUTO: 0.3 %
BILIRUB SERPL-MCNC: 0.4 MG/DL (ref 0.2–1.3)
BILIRUB SERPL-MCNC: 0.5 MG/DL (ref 0.2–1.3)
BUN SERPL-MCNC: 143 MG/DL (ref 7–30)
BUN SERPL-MCNC: 144 MG/DL (ref 7–30)
BUN SERPL-MCNC: 24 MG/DL (ref 7–30)
BUN SERPL-MCNC: 25 MG/DL (ref 7–30)
CALCIUM SERPL-MCNC: 7.9 MG/DL (ref 8.5–10.1)
CALCIUM SERPL-MCNC: 8 MG/DL (ref 8.5–10.1)
CALCIUM SERPL-MCNC: 9 MG/DL (ref 8.5–10.1)
CALCIUM SERPL-MCNC: 9.1 MG/DL (ref 8.5–10.1)
CHLORIDE SERPL-SCNC: 102 MMOL/L (ref 94–109)
CHLORIDE SERPL-SCNC: 102 MMOL/L (ref 94–109)
CHLORIDE SERPL-SCNC: 105 MMOL/L (ref 94–109)
CHLORIDE SERPL-SCNC: 108 MMOL/L (ref 94–109)
CO2 SERPL-SCNC: 27 MMOL/L (ref 20–32)
CO2 SERPL-SCNC: 28 MMOL/L (ref 20–32)
CORTIS SERPL-MCNC: 12.8 UG/DL (ref 4–22)
CREAT SERPL-MCNC: 1.32 MG/DL (ref 0.66–1.25)
CREAT SERPL-MCNC: 1.42 MG/DL (ref 0.66–1.25)
CREAT SERPL-MCNC: 5.71 MG/DL (ref 0.66–1.25)
CREAT SERPL-MCNC: 5.73 MG/DL (ref 0.66–1.25)
CRP SERPL-MCNC: 3.1 MG/L (ref 0–8)
D DIMER PPP FEU-MCNC: 9 UG/ML FEU (ref 0–0.5)
DIFFERENTIAL METHOD BLD: ABNORMAL
DIFFERENTIAL METHOD BLD: NORMAL
EOSINOPHIL # BLD AUTO: 0.1 10E9/L (ref 0–0.7)
EOSINOPHIL NFR BLD AUTO: 0.5 %
EOSINOPHIL NFR BLD AUTO: 1 %
ERYTHROCYTE [DISTWIDTH] IN BLOOD BY AUTOMATED COUNT: 13.7 % (ref 10–15)
ERYTHROCYTE [DISTWIDTH] IN BLOOD BY AUTOMATED COUNT: 15.1 % (ref 10–15)
GFR SERPL CREATININE-BSD FRML MDRD: 10 ML/MIN/1.7M2
GFR SERPL CREATININE-BSD FRML MDRD: 10 ML/MIN/1.7M2
GFR SERPL CREATININE-BSD FRML MDRD: 48 ML/MIN/1.7M2
GFR SERPL CREATININE-BSD FRML MDRD: 52 ML/MIN/1.7M2
GLUCOSE BLDC GLUCOMTR-MCNC: 155 MG/DL (ref 70–99)
GLUCOSE BLDC GLUCOMTR-MCNC: 165 MG/DL (ref 70–99)
GLUCOSE BLDC GLUCOMTR-MCNC: 165 MG/DL (ref 70–99)
GLUCOSE BLDC GLUCOMTR-MCNC: 194 MG/DL (ref 70–99)
GLUCOSE BLDC GLUCOMTR-MCNC: 224 MG/DL (ref 70–99)
GLUCOSE SERPL-MCNC: 198 MG/DL (ref 70–99)
GLUCOSE SERPL-MCNC: 213 MG/DL (ref 70–99)
GLUCOSE SERPL-MCNC: 97 MG/DL (ref 70–99)
GLUCOSE SERPL-MCNC: 98 MG/DL (ref 70–99)
HCO3 BLDV-SCNC: 27 MMOL/L (ref 21–28)
HCO3 BLDV-SCNC: 27 MMOL/L (ref 21–28)
HCT VFR BLD AUTO: 36.7 % (ref 40–53)
HCT VFR BLD AUTO: 46.1 % (ref 40–53)
HGB BLD-MCNC: 11.8 G/DL (ref 13.3–17.7)
HGB BLD-MCNC: 14.7 G/DL (ref 13.3–17.7)
IMM GRANULOCYTES # BLD: 0.1 10E9/L (ref 0–0.4)
IMM GRANULOCYTES NFR BLD: 0.9 %
INTERPRETATION MONITOR -MUSE: NORMAL
LYMPHOCYTES # BLD AUTO: 1 10E9/L (ref 0.8–5.3)
LYMPHOCYTES # BLD AUTO: 1.6 10E9/L (ref 0.8–5.3)
LYMPHOCYTES NFR BLD AUTO: 10.3 %
LYMPHOCYTES NFR BLD AUTO: 25 %
MAGNESIUM SERPL-MCNC: 2.7 MG/DL (ref 1.6–2.3)
MCH RBC QN AUTO: 29.2 PG (ref 26.5–33)
MCH RBC QN AUTO: 32.3 PG (ref 26.5–33)
MCHC RBC AUTO-ENTMCNC: 31.9 G/DL (ref 31.5–36.5)
MCHC RBC AUTO-ENTMCNC: 32.2 G/DL (ref 31.5–36.5)
MCV RBC AUTO: 101 FL (ref 78–100)
MCV RBC AUTO: 92 FL (ref 78–100)
MONOCYTES # BLD AUTO: 0.5 10E9/L (ref 0–1.3)
MONOCYTES # BLD AUTO: 0.6 10E9/L (ref 0–1.3)
MONOCYTES NFR BLD AUTO: 6.4 %
MONOCYTES NFR BLD AUTO: 7 %
NEUTROPHILS # BLD AUTO: 4.3 10E9/L (ref 1.6–8.3)
NEUTROPHILS # BLD AUTO: 7.5 10E9/L (ref 1.6–8.3)
NEUTROPHILS NFR BLD AUTO: 67 %
NEUTROPHILS NFR BLD AUTO: 81.6 %
NRBC # BLD AUTO: 0 10*3/UL
NRBC BLD AUTO-RTO: 0 /100
NT-PROBNP SERPL-MCNC: 5673 PG/ML (ref 0–1800)
NT-PROBNP SERPL-MCNC: 819 PG/ML (ref 0–1800)
O2/TOTAL GAS SETTING VFR VENT: 44 %
O2/TOTAL GAS SETTING VFR VENT: ABNORMAL %
PCO2 BLDV: 66 MM HG (ref 40–50)
PCO2 BLDV: 72 MM HG (ref 40–50)
PH BLDV: 7.18 PH (ref 7.32–7.43)
PH BLDV: 7.21 PH (ref 7.32–7.43)
PLATELET # BLD AUTO: 136 10E9/L (ref 150–450)
PLATELET # BLD AUTO: 163 10E9/L (ref 150–450)
PO2 BLDV: 36 MM HG (ref 25–47)
PO2 BLDV: 83 MM HG (ref 25–47)
POTASSIUM SERPL-SCNC: 4.2 MMOL/L (ref 3.4–5.3)
POTASSIUM SERPL-SCNC: 4.4 MMOL/L (ref 3.4–5.3)
POTASSIUM SERPL-SCNC: 6.8 MMOL/L (ref 3.4–5.3)
POTASSIUM SERPL-SCNC: 6.8 MMOL/L (ref 3.4–5.3)
POTASSIUM SERPL-SCNC: 6.9 MMOL/L (ref 3.4–5.3)
PROT SERPL-MCNC: 6.4 G/DL (ref 6.8–8.8)
PROT SERPL-MCNC: 8 G/DL (ref 6.8–8.8)
RBC # BLD AUTO: 3.65 10E12/L (ref 4.4–5.9)
RBC # BLD AUTO: 5.03 10E12/L (ref 4.4–5.9)
RENIN PLAS-CCNC: 1.3 NG/ML/HR
SODIUM SERPL-SCNC: 138 MMOL/L (ref 133–144)
SODIUM SERPL-SCNC: 138 MMOL/L (ref 133–144)
SODIUM SERPL-SCNC: 140 MMOL/L (ref 133–144)
SODIUM SERPL-SCNC: 143 MMOL/L (ref 133–144)
TROPONIN I SERPL-MCNC: 0.03 UG/L (ref 0–0.04)
TROPONIN I SERPL-MCNC: 0.14 UG/L (ref 0–0.04)
TSH SERPL DL<=0.005 MIU/L-ACNC: 2.19 MU/L (ref 0.4–4)
TSH SERPL DL<=0.005 MIU/L-ACNC: 2.24 MU/L (ref 0.4–4)
WBC # BLD AUTO: 6.5 10E9/L (ref 4–11)
WBC # BLD AUTO: 9.2 10E9/L (ref 4–11)

## 2018-01-01 PROCEDURE — 93005 ELECTROCARDIOGRAM TRACING: CPT | Mod: 76 | Performed by: EMERGENCY MEDICINE

## 2018-01-01 PROCEDURE — 84132 ASSAY OF SERUM POTASSIUM: CPT | Mod: 91 | Performed by: EMERGENCY MEDICINE

## 2018-01-01 PROCEDURE — 85025 COMPLETE CBC W/AUTO DIFF WBC: CPT | Performed by: EMERGENCY MEDICINE

## 2018-01-01 PROCEDURE — 82533 TOTAL CORTISOL: CPT | Performed by: EMERGENCY MEDICINE

## 2018-01-01 PROCEDURE — A9270 NON-COVERED ITEM OR SERVICE: HCPCS | Mod: GY | Performed by: EMERGENCY MEDICINE

## 2018-01-01 PROCEDURE — 99291 CRITICAL CARE FIRST HOUR: CPT | Mod: 25 | Performed by: EMERGENCY MEDICINE

## 2018-01-01 PROCEDURE — 96361 HYDRATE IV INFUSION ADD-ON: CPT | Performed by: EMERGENCY MEDICINE

## 2018-01-01 PROCEDURE — 80053 COMPREHEN METABOLIC PANEL: CPT | Performed by: EMERGENCY MEDICINE

## 2018-01-01 PROCEDURE — 99284 EMERGENCY DEPT VISIT MOD MDM: CPT | Mod: Z6 | Performed by: EMERGENCY MEDICINE

## 2018-01-01 PROCEDURE — 83880 ASSAY OF NATRIURETIC PEPTIDE: CPT | Performed by: EMERGENCY MEDICINE

## 2018-01-01 PROCEDURE — 87040 BLOOD CULTURE FOR BACTERIA: CPT | Performed by: EMERGENCY MEDICINE

## 2018-01-01 PROCEDURE — 25000128 H RX IP 250 OP 636: Performed by: EMERGENCY MEDICINE

## 2018-01-01 PROCEDURE — 84443 ASSAY THYROID STIM HORMONE: CPT | Performed by: EMERGENCY MEDICINE

## 2018-01-01 PROCEDURE — 25000125 ZZHC RX 250

## 2018-01-01 PROCEDURE — 99214 OFFICE O/P EST MOD 30 MIN: CPT | Performed by: INTERNAL MEDICINE

## 2018-01-01 PROCEDURE — 93227 XTRNL ECG REC<48 HR R&I: CPT | Performed by: INTERNAL MEDICINE

## 2018-01-01 PROCEDURE — 00000146 ZZHCL STATISTIC GLUCOSE BY METER IP

## 2018-01-01 PROCEDURE — 99283 EMERGENCY DEPT VISIT LOW MDM: CPT | Mod: Z6 | Performed by: EMERGENCY MEDICINE

## 2018-01-01 PROCEDURE — 93226 XTRNL ECG REC<48 HR SCAN A/R: CPT

## 2018-01-01 PROCEDURE — 80048 BASIC METABOLIC PNL TOTAL CA: CPT | Performed by: EMERGENCY MEDICINE

## 2018-01-01 PROCEDURE — 94640 AIRWAY INHALATION TREATMENT: CPT

## 2018-01-01 PROCEDURE — 36415 COLL VENOUS BLD VENIPUNCTURE: CPT | Performed by: INTERNAL MEDICINE

## 2018-01-01 PROCEDURE — 25800025 ZZH RX 258: Performed by: EMERGENCY MEDICINE

## 2018-01-01 PROCEDURE — 25000131 ZZH RX MED GY IP 250 OP 636 PS 637: Mod: GY | Performed by: EMERGENCY MEDICINE

## 2018-01-01 PROCEDURE — 99282 EMERGENCY DEPT VISIT SF MDM: CPT | Performed by: EMERGENCY MEDICINE

## 2018-01-01 PROCEDURE — 0298T ZZC EXT ECG > 48HR TO 21 DAY REVIEW AND INTERPRETATN: CPT | Performed by: INTERNAL MEDICINE

## 2018-01-01 PROCEDURE — 80048 BASIC METABOLIC PNL TOTAL CA: CPT | Performed by: INTERNAL MEDICINE

## 2018-01-01 PROCEDURE — 99205 OFFICE O/P NEW HI 60 MIN: CPT | Performed by: INTERNAL MEDICINE

## 2018-01-01 PROCEDURE — 86140 C-REACTIVE PROTEIN: CPT | Performed by: EMERGENCY MEDICINE

## 2018-01-01 PROCEDURE — 40000275 ZZH STATISTIC RCP TIME EA 10 MIN

## 2018-01-01 PROCEDURE — 99283 EMERGENCY DEPT VISIT LOW MDM: CPT | Performed by: EMERGENCY MEDICINE

## 2018-01-01 PROCEDURE — 96375 TX/PRO/DX INJ NEW DRUG ADDON: CPT | Performed by: EMERGENCY MEDICINE

## 2018-01-01 PROCEDURE — 99292 CRITICAL CARE ADDL 30 MIN: CPT | Mod: 25 | Performed by: EMERGENCY MEDICINE

## 2018-01-01 PROCEDURE — 82803 BLOOD GASES ANY COMBINATION: CPT | Performed by: EMERGENCY MEDICINE

## 2018-01-01 PROCEDURE — 0296T ZIO PATCH HOLTER: CPT

## 2018-01-01 PROCEDURE — 84244 ASSAY OF RENIN: CPT | Performed by: EMERGENCY MEDICINE

## 2018-01-01 PROCEDURE — 25000125 ZZHC RX 250: Performed by: EMERGENCY MEDICINE

## 2018-01-01 PROCEDURE — 93005 ELECTROCARDIOGRAM TRACING: CPT | Performed by: EMERGENCY MEDICINE

## 2018-01-01 PROCEDURE — 84484 ASSAY OF TROPONIN QUANT: CPT | Performed by: EMERGENCY MEDICINE

## 2018-01-01 PROCEDURE — 93000 ELECTROCARDIOGRAM COMPLETE: CPT | Performed by: INTERNAL MEDICINE

## 2018-01-01 PROCEDURE — 83735 ASSAY OF MAGNESIUM: CPT | Performed by: EMERGENCY MEDICINE

## 2018-01-01 PROCEDURE — 96365 THER/PROPH/DIAG IV INF INIT: CPT | Performed by: EMERGENCY MEDICINE

## 2018-01-01 PROCEDURE — 25000132 ZZH RX MED GY IP 250 OP 250 PS 637: Mod: GY | Performed by: EMERGENCY MEDICINE

## 2018-01-01 PROCEDURE — 85379 FIBRIN DEGRADATION QUANT: CPT | Performed by: EMERGENCY MEDICINE

## 2018-01-01 PROCEDURE — 96376 TX/PRO/DX INJ SAME DRUG ADON: CPT | Performed by: EMERGENCY MEDICINE

## 2018-01-01 PROCEDURE — 71045 X-RAY EXAM CHEST 1 VIEW: CPT | Mod: TC

## 2018-01-01 RX ORDER — NICOTINE POLACRILEX 4 MG
15-30 LOZENGE BUCCAL
Status: DISCONTINUED | OUTPATIENT
Start: 2018-01-01 | End: 2018-01-01 | Stop reason: HOSPADM

## 2018-01-01 RX ORDER — LISINOPRIL 5 MG/1
7.5 TABLET ORAL DAILY
Qty: 180 TABLET | Refills: 3 | COMMUNITY
Start: 2018-01-01

## 2018-01-01 RX ORDER — CALCIUM GLUCONATE 94 MG/ML
1 INJECTION, SOLUTION INTRAVENOUS ONCE
Status: COMPLETED | OUTPATIENT
Start: 2018-01-01 | End: 2018-01-01

## 2018-01-01 RX ORDER — DEXTROSE MONOHYDRATE 100 MG/ML
INJECTION, SOLUTION INTRAVENOUS CONTINUOUS
Status: DISCONTINUED | OUTPATIENT
Start: 2018-01-01 | End: 2018-01-01 | Stop reason: HOSPADM

## 2018-01-01 RX ORDER — DIPHENHYDRAMINE HYDROCHLORIDE 50 MG/ML
25 INJECTION INTRAMUSCULAR; INTRAVENOUS ONCE
Status: COMPLETED | OUTPATIENT
Start: 2018-01-01 | End: 2018-01-01

## 2018-01-01 RX ORDER — IPRATROPIUM BROMIDE AND ALBUTEROL SULFATE 2.5; .5 MG/3ML; MG/3ML
SOLUTION RESPIRATORY (INHALATION)
Status: COMPLETED
Start: 2018-01-01 | End: 2018-01-01

## 2018-01-01 RX ORDER — FUROSEMIDE 10 MG/ML
20 INJECTION INTRAMUSCULAR; INTRAVENOUS ONCE
Status: DISCONTINUED | OUTPATIENT
Start: 2018-01-01 | End: 2018-01-01

## 2018-01-01 RX ORDER — DEXTROSE MONOHYDRATE 25 G/50ML
25 INJECTION, SOLUTION INTRAVENOUS ONCE
Status: COMPLETED | OUTPATIENT
Start: 2018-01-01 | End: 2018-01-01

## 2018-01-01 RX ORDER — FUROSEMIDE 10 MG/ML
20 INJECTION INTRAMUSCULAR; INTRAVENOUS ONCE
Status: COMPLETED | OUTPATIENT
Start: 2018-01-01 | End: 2018-01-01

## 2018-01-01 RX ORDER — IPRATROPIUM BROMIDE AND ALBUTEROL 20; 100 UG/1; UG/1
SPRAY, METERED RESPIRATORY (INHALATION)
Qty: 24 G | Refills: 0 | Status: SHIPPED | OUTPATIENT
Start: 2018-01-01

## 2018-01-01 RX ORDER — SODIUM CHLORIDE 9 MG/ML
1000 INJECTION, SOLUTION INTRAVENOUS CONTINUOUS
Status: DISCONTINUED | OUTPATIENT
Start: 2018-01-01 | End: 2018-01-01 | Stop reason: HOSPADM

## 2018-01-01 RX ORDER — ALPRAZOLAM 0.5 MG
0.5 TABLET ORAL ONCE
Status: COMPLETED | OUTPATIENT
Start: 2018-01-01 | End: 2018-01-01

## 2018-01-01 RX ORDER — CALCIUM CHLORIDE 100 MG/ML
1 INJECTION INTRAVENOUS; INTRAVENTRICULAR ONCE
Status: COMPLETED | OUTPATIENT
Start: 2018-01-01 | End: 2018-01-01

## 2018-01-01 RX ORDER — DEXAMETHASONE SODIUM PHOSPHATE 10 MG/ML
10 INJECTION, SOLUTION INTRAMUSCULAR; INTRAVENOUS ONCE
Status: COMPLETED | OUTPATIENT
Start: 2018-01-01 | End: 2018-01-01

## 2018-01-01 RX ORDER — ATORVASTATIN CALCIUM 10 MG/1
TABLET, FILM COATED ORAL
Qty: 90 TABLET | Refills: 3 | OUTPATIENT
Start: 2018-01-01

## 2018-01-01 RX ORDER — MULTIPLE VITAMINS W/ MINERALS TAB 9MG-400MCG
1 TAB ORAL DAILY
COMMUNITY

## 2018-01-01 RX ORDER — DEXTROSE MONOHYDRATE 25 G/50ML
25-50 INJECTION, SOLUTION INTRAVENOUS
Status: DISCONTINUED | OUTPATIENT
Start: 2018-01-01 | End: 2018-01-01 | Stop reason: HOSPADM

## 2018-01-01 RX ORDER — OLANZAPINE 5 MG/1
5 TABLET, ORALLY DISINTEGRATING ORAL ONCE
Status: DISCONTINUED | OUTPATIENT
Start: 2018-01-01 | End: 2018-01-01

## 2018-01-01 RX ADMIN — IPRATROPIUM BROMIDE AND ALBUTEROL SULFATE: .5; 3 SOLUTION RESPIRATORY (INHALATION) at 14:55

## 2018-01-01 RX ADMIN — DEXTROSE MONOHYDRATE 25 G: 25 INJECTION, SOLUTION INTRAVENOUS at 18:13

## 2018-01-01 RX ADMIN — DEXTROSE MONOHYDRATE: 100 INJECTION, SOLUTION INTRAVENOUS at 18:15

## 2018-01-01 RX ADMIN — CALCIUM GLUCONATE 1 G: 98 INJECTION, SOLUTION INTRAVENOUS at 19:39

## 2018-01-01 RX ADMIN — FUROSEMIDE 20 MG: 10 INJECTION, SOLUTION INTRAVENOUS at 15:09

## 2018-01-01 RX ADMIN — HUMAN INSULIN 5 UNITS: 100 INJECTION, SOLUTION SUBCUTANEOUS at 18:13

## 2018-01-01 RX ADMIN — SODIUM CHLORIDE 500 ML: 9 INJECTION, SOLUTION INTRAVENOUS at 17:39

## 2018-01-01 RX ADMIN — DEXAMETHASONE SODIUM PHOSPHATE 10 MG: 10 INJECTION, SOLUTION INTRAMUSCULAR; INTRAVENOUS at 15:07

## 2018-01-01 RX ADMIN — SODIUM CHLORIDE 1000 ML: 9 INJECTION, SOLUTION INTRAVENOUS at 19:02

## 2018-01-01 RX ADMIN — DIPHENHYDRAMINE HYDROCHLORIDE 25 MG: 50 INJECTION, SOLUTION INTRAMUSCULAR; INTRAVENOUS at 15:39

## 2018-01-01 RX ADMIN — ALPRAZOLAM 0.5 MG: 0.5 TABLET ORAL at 15:39

## 2018-01-01 RX ADMIN — SODIUM CHLORIDE 500 ML: 9 INJECTION, SOLUTION INTRAVENOUS at 14:49

## 2018-01-01 RX ADMIN — HUMAN INSULIN 5 UNITS: 100 INJECTION, SOLUTION SUBCUTANEOUS at 19:47

## 2018-01-01 RX ADMIN — CALCIUM CHLORIDE 1 G: 100 INJECTION INTRAVENOUS; INTRAVENTRICULAR at 15:42

## 2018-01-01 RX ADMIN — SODIUM BICARBONATE 50 MEQ: 84 INJECTION, SOLUTION INTRAVENOUS at 18:19

## 2018-01-01 ASSESSMENT — ENCOUNTER SYMPTOMS
SHORTNESS OF BREATH: 1
WEAKNESS: 0
FEVER: 0
DIZZINESS: 0
CHILLS: 0
LIGHT-HEADEDNESS: 0
NUMBNESS: 0

## 2018-01-01 ASSESSMENT — PAIN SCALES - GENERAL
PAINLEVEL: NO PAIN (0)
PAINLEVEL: NO PAIN (0)

## 2018-01-02 NOTE — TELEPHONE ENCOUNTER
Combivent  Prescription approved per [facility].    Lipitor  Prescription was sent 4/26/17 for #90 with 3 refills.  Pharmacy notified via E-Prescribe refusal.     Pam Ruano RN  [facility]

## 2018-03-14 NOTE — ED AVS SNAPSHOT
" [facility] Emergency Department    [address] [place] [address]    Phone:  [phone]    Fax:  [phone]                                       Ion Johnson   MRN: [id number]    Department:  [facility] Emergency Department   Date of Visit:  3/14/2018           Patient Information     Date Of Birth          1940        Your diagnoses for this visit were:     Hypertension goal BP (blood pressure) < 140/90     CKD (chronic kidney disease) stage 3, GFR 30-59 ml/min        You were seen by Malik Buckner MD.      Follow-up Information     Schedule an appointment as soon as possible for a visit with Center, [facility].    Contact information:    [address] [facility] [address]          Discharge Instructions       High Blood Pressure and Chronic Kidney Disease (CKD)    I would recommend you increase your lisinopril to 15 mg daily and that you follow-up with your doctor at the [facility] who is been managing your hypertension.  I would recommend getting a renal artery study to evaluate the degree of stenosis (blockage of blood flow) to the kidney that may be making your blood pressure control more difficult.    What is high blood pressure?  For CKD patients, a normal blood pressure is 130/80 (or \"130 over 80\"). When blood pressure stays at 140/90 or higher, it is called high blood pressure (hypertension).  How are kidney disease and high blood pressure related?    More than half of the patients who have chronic kidney disease also have high blood pressure.    High blood pressure increases the chance that kidney disease will get worse.    High blood pressure is a major cause of CKD.   Damaged blood vessels send less blood to the important organs in your body, including your kidneys. Your kidneys filter waste from your blood. When your kidneys can't clean your blood as well as they should, this waste builds up in your body.    CKD can also cause " high blood pressure. Your kidneys help keep your blood pressure in a healthy range. Controlling your blood pressure will keep your kidneys from getting worse. This will make CKD easier for you and your doctor to manage.  How do I treat high blood pressure and CKD?    Your doctor will give you blood pressure goals to meet and show you how to check your blood pressure at home.    It is important that you check your pressure as directed. High blood pressure often has no symptoms.    Make sure to write down the date, time and result of your readings.    If you take blood pressure medicine, take it before you measure blood pressure. This helps us know if your medicine is working the way it should.    Stop smoking.    Follow your diet and exercise plan.    Take all medicines as directed.    If you have kidney disease from diabetes or if you have protein in your urine, the best blood pressure medicines for your treatment are angiotensin converting enzyme (ACE) inhibitors or angiotensin receptor blockers (ARB).    If you have any side effects from your blood pressure medicine, tell your doctor. He or she may switch you to a different medicine.  How often should I see my doctor?    Your CKD team will outline a treatment plan for you after you are diagnosed. Most patients come to the clinic 1 or 2 times per year. We'll ask you to come in more often if:    You start a new medicine or we change your medicine dose    Your kidney function is getting worse    You blood pressure is not controlled    At each visit, we will test your blood and urine and measure your blood pressure. (Remember to check your blood pressure at home to help guide your treatment.)    DON'T be afraid to ask questions. We are here to help you.  Other resources  National Kidney Foundation   www.kidney.org  1-613.441.8425  For informational purposes only. Not to replace the advice of your health care provider.   Copyright   2016 [employer] [facility]. All  rights reserved. [employer] [id number] - 03/16.      24 Hour Appointment Hotline       To make an appointment at any [facility], call [phone] ([phone]). If you don't have a family doctor or clinic, we will help you find one. [place] clinics are conveniently located to serve the needs of you and your family.             Review of your medicines      Our records show that you are taking the medicines listed below. If these are incorrect, please call your family doctor or clinic.        Dose / Directions Last dose taken    ALPRAZolam 0.25 MG tablet   Commonly known as:  XANAX   Dose:  0.25 mg   Quantity:  90 tablet        Take 1 tablet (0.25 mg) by mouth 3 times daily as needed for anxiety   Refills:  0        atorvastatin 10 MG tablet   Commonly known as:  LIPITOR   Dose:  10 mg   Quantity:  90 tablet        Take 1 tablet (10 mg) by mouth daily   Refills:  3        BROVANA 15 MCG/2ML Nebu neb solution   Dose:  15 mcg   Quantity:  120 mL   Generic drug:  arformoterol        Take 2 mLs (15 mcg) by nebulization 2 times daily   Refills:  11        budesonide 1 MG/2ML Susp neb solution   Commonly known as:  PULMICORT   Dose:  1 mg   Quantity:  360 mL        Take 2 mLs (1 mg) by nebulization 2 times daily   Refills:  3        COMBIVENT RESPIMAT  MCG/ACT inhaler   Quantity:  24 g   Generic drug:  Ipratropium-Albuterol        USE 1 INHALATION FOUR TIMES A DAY, DO NOT EXCEED 6 INHALATIONS PER DAY   Refills:  0        COQ-10 PO        Reported on 4/26/2017   Refills:  0        diltiazem 120 MG tablet   Commonly known as:  CARDIZEM   Dose:  120 mg   Quantity:  90 tablet        Take 1 tablet (120 mg) by mouth daily 1 tablet daily   Refills:  3        * lisinopril 2.5 MG tablet   Commonly known as:  PRINIVIL/Zestril   Dose:  2.5 mg   Quantity:  180 tablet        Take 1 tablet (2.5 mg) by mouth 2 times daily To take along with lisinopril 5 mg for total of lisinopril 7.5 mg twice daily   Refills:  3        *  lisinopril 5 MG tablet   Commonly known as:  PRINIVIL/ZESTRIL   Dose:  5 mg   Quantity:  180 tablet        Take 1 tablet (5 mg) by mouth 2 times daily   Refills:  3        MULTI-VITAMIN PO        Reported on 4/26/2017   Refills:  0        order for DME   Quantity:  1 each        Equipment being ordered: Portable Nebulizer machine ([employer] Micro-Air Electronic Nebulizer System [id number]   Refills:  0        order for DME   Quantity:  1 Device        Equipment being ordered: Oxygen with portability and all necessary accessories   Refills:  0        sildenafil 100 MG tablet   Commonly known as:  VIAGRA   Dose:  100 mg   Quantity:  10 tablet        Take 1 tablet (100 mg) by mouth daily as needed   Refills:  11        terazosin 2 MG capsule   Commonly known as:  HYTRIN   Dose:  2 mg   Quantity:  30 capsule        Take 1 capsule (2 mg) by mouth At Bedtime   Refills:  1        tiotropium 18 MCG capsule   Commonly known as:  SPIRIVA HANDIHALER   Quantity:  90 capsule        Inhale contents of one capsule daily.   Refills:  3        * Notice:  This list has 2 medication(s) that are the same as other medications prescribed for you. Read the directions carefully, and ask your doctor or other care provider to review them with you.            Procedures and tests performed during your visit     CBC with platelets differential    CRP inflammation    Comprehensive metabolic panel    Cortisol    Nt probnp inpatient (BNP)    Peripheral IV catheter    Renin activity    TSH with free T4 reflex    Troponin I      Orders Needing Specimen Collection     None      Pending Results     Date and Time Order Name Status Description    3/14/2018 2014 Renin activity In process     3/14/2018 2014 Cortisol In process             Pending Culture Results     No orders found from 3/12/2018 to 3/15/2018.            Pending Results Instructions     If you had any lab results that were not finalized at the time of your Discharge, you can call the ED Lab  "Result RN at [phone]. You will be contacted by this team for any positive Lab results or changes in treatment. The nurses are available 7 days a week from 10A to 6:30P.  You can leave a message 24 hours per day and they will return your call.        Thank you for choosing [facility]       Thank you for choosing [facility] for your care. Our goal is always to provide you with excellent care. Hearing back from our patients is one way we can continue to improve our services. Please take a few minutes to complete the written survey that you may receive in the mail after you visit with us. Thank you!        [employer] Information     [employer] lets you send messages to your doctor, view your test results, renew your prescriptions, schedule appointments and more. To sign up, go to [email or web address] . Click on \"Log in\" on the left side of the screen, which will take you to the Welcome page. Then click on \"Sign up Now\" on the right side of the page.     You will be asked to enter the access code listed below, as well as some personal information. Please follow the directions to create your username and password.     Your access code is: [email or web address]  Expires: 2018 10:14 PM     Your access code will  in 90 days. If you need help or a new code, please call your [facility] clinic or [phone].        Care EveryWhere ID     This is your Care EveryWhere ID. This could be used by other organizations to access your [facility] medical records  [id number]        Equal Access to Services     KYLAH ROGER : Hadii katie wilhelmo Sozaid, waaxda luqadaha, qaybta kaalmada lolis, nancy an . So [facility] [phone].    ATENCIÓN: Si habla español, tiene a john disposición servicios gratuitos de asistencia lingüística. Llame al [phone].    We comply with applicable federal civil rights laws and [place] laws. We do not discriminate on the basis of race, color, national origin, age, " disability, sex, sexual orientation, or gender identity.            After Visit Summary       This is your record. Keep this with you and show to your community pharmacist(s) and doctor(s) at your next visit.

## 2018-03-14 NOTE — ED AVS SNAPSHOT
[facility] Emergency Department    [address] [place] [address]    Phone:  [phone]    Fax:  [phone]                                       Ion Johnson   MRN: [id number]    Department:  [facility] Emergency Department   Date of Visit:  3/14/2018           After Visit Summary Signature Page     I have received my discharge instructions, and my questions have been answered. I have discussed any challenges I see with this plan with the nurse or doctor.    ..........................................................................................................................................  Patient/Patient Representative Signature      ..........................................................................................................................................  Patient Representative Print Name and Relationship to Patient    ..................................................               ................................................  Date                                            Time    ..........................................................................................................................................  Reviewed by Signature/Title    ...................................................              ..............................................  Date                                                            Time

## 2018-03-14 NOTE — TELEPHONE ENCOUNTER
Reason for Disposition    BP  >= 180/110    Additional Information    Negative: Difficult to awaken or acting confused  (e.g., disoriented, slurred speech)    Negative: Severe difficulty breathing (e.g., struggling for each breath, speaks in single words)    Negative: [1] Weakness of the face, arm or leg on one side of the body AND [2] new onset    Negative: [1] Numbness (i.e., loss of sensation) of the face, arm or leg on one side of the body AND [2] new onset    Negative: [1] Chest pain lasts > 5 minutes AND [2] history of heart disease  (i.e., heart attack, bypass surgery, angina, angioplasty, CHF)    Negative: [1] Chest pain AND [2] took nitrogylcerin AND [3] pain was not relieved    Negative: Sounds like a life-threatening emergency to the triager    Negative: Symptom is main concern  (e.g., headache, chest pain)    Negative: Low blood pressure is main concern    Negative: [1] BP  >= 160 / 100 AND [2] cardiac or neurologic symptoms    (e.g., chest pain, difficulty breathing, unsteady gait, blurred vision)    Negative: [1] Pregnant AND [2] new hand or face swelling    Negative: [1] Pregnant > 20 weeks AND [2] BP  >= 140/90    Negative: [1] BP  >= 200/120  AND [2] having NO cardiac or neurologic symptoms    Negative: [1] BP  >= 180/110 AND [2] missed most recent dose of blood pressure medication    Protocols used: HIGH BLOOD PRESSURE-ADULT-    Ion calls and says that his blood pressure has been high, this kwaku. BP = 190/108 @ 1855, and 196/118 @ 1903. Denies any chest pain, dizziness, visual changes, or a headache. Ion says that his wife will take him to an ER now.

## 2018-03-14 NOTE — TELEPHONE ENCOUNTER
Panel Management Review      Patient has the following on his problem list:       Composite cancer screening  Chart review shows that this patient is due/due soon for the following   Summary:    Patient is due/failing the following:   BMP, blood pressure check    Action needed:   Patient needs fasting lab only appointment and Patient needs nurse only appointment.    Type of outreach:    Phone, spoke to patient.  The patient stated he sees the [facility] for his blood pressure.  He stated barrow can come to the clinic and see the float nurse for a blood pressure check.    Questions for provider review:    None                                                                                                                                    Latanya TREVINO LPN       Chart routed to Latanya TREVINO LPN   .

## 2018-03-15 NOTE — DISCHARGE INSTRUCTIONS
"High Blood Pressure and Chronic Kidney Disease (CKD)    I would recommend you increase your lisinopril to 15 mg daily and that you follow-up with your doctor at the [facility] who is been managing your hypertension.  I would recommend getting a renal artery study to evaluate the degree of stenosis (blockage of blood flow) to the kidney that may be making your blood pressure control more difficult.    What is high blood pressure?  For CKD patients, a normal blood pressure is 130/80 (or \"130 over 80\"). When blood pressure stays at 140/90 or higher, it is called high blood pressure (hypertension).  How are kidney disease and high blood pressure related?    More than half of the patients who have chronic kidney disease also have high blood pressure.    High blood pressure increases the chance that kidney disease will get worse.    High blood pressure is a major cause of CKD.   Damaged blood vessels send less blood to the important organs in your body, including your kidneys. Your kidneys filter waste from your blood. When your kidneys can't clean your blood as well as they should, this waste builds up in your body.    CKD can also cause high blood pressure. Your kidneys help keep your blood pressure in a healthy range. Controlling your blood pressure will keep your kidneys from getting worse. This will make CKD easier for you and your doctor to manage.  How do I treat high blood pressure and CKD?    Your doctor will give you blood pressure goals to meet and show you how to check your blood pressure at home.    It is important that you check your pressure as directed. High blood pressure often has no symptoms.    Make sure to write down the date, time and result of your readings.    If you take blood pressure medicine, take it before you measure blood pressure. This helps us know if your medicine is working the way it should.    Stop smoking.    Follow your diet and exercise plan.    Take all medicines as " directed.    If you have kidney disease from diabetes or if you have protein in your urine, the best blood pressure medicines for your treatment are angiotensin converting enzyme (ACE) inhibitors or angiotensin receptor blockers (ARB).    If you have any side effects from your blood pressure medicine, tell your doctor. He or she may switch you to a different medicine.  How often should I see my doctor?    Your CKD team will outline a treatment plan for you after you are diagnosed. Most patients come to the clinic 1 or 2 times per year. We'll ask you to come in more often if:    You start a new medicine or we change your medicine dose    Your kidney function is getting worse    You blood pressure is not controlled    At each visit, we will test your blood and urine and measure your blood pressure. (Remember to check your blood pressure at home to help guide your treatment.)    DON'T be afraid to ask questions. We are here to help you.  Other resources  National Kidney Foundation   www.kidney.org  6-218-265-2497  For informational purposes only. Not to replace the advice of your health care provider.   Copyright   2016 [employer]. All rights reserved. [employer] [id number] - 03/16.

## 2018-03-15 NOTE — ED NOTES
Pt experiencing HTN at home. Was told to come in and be checked. Denies any symptoms associated with the elevated BP.

## 2018-03-15 NOTE — ED PROVIDER NOTES
History     Chief Complaint   Patient presents with     Hypertension     The history is provided by the patient and medical records.     Ion Johnson is a 78 year old male with a past medical history of hypertension, CKD Stage 3, hyperlipidemia, acute respiratory failure, COPD and CHF who presents to the ED for evaluation of hypertension. Patient reports that he has been taking his blood pressures at home and they have noted to be elevated. Typically his blood pressure would range from 140-150 systolic over 70-80 diastolic, but they have been in the 180's/100's. He was originally on 2 mg of Terazosin along with Lisinopril 5mg BID, which he thought was helping his blood pressure, but once he found out he had bladder cancer he was taken off Terazosin. On 2/27/18, he increased his Lisinopril from 5 mg daily to 10 mg daily. He denies lightheadedness, headache, vision changes or dizziness. He typically drinks plenty of fluids, but he has not drank as much as he usually does. He was in the [place] and was exposed to Agent Orange. He is a previous smoker.         Problem List:    Patient Active Problem List    Diagnosis Date Noted     Chronic airway obstruction (H) 06/23/2004     Priority: High     Problem list name updated by automated process. Provider to review       Anxiety 05/02/2017     Priority: Medium     CHF (congestive heart failure) (H) 09/17/2013     Priority: Medium     Advanced directives, counseling/discussion 11/02/2012     Priority: Medium     .Discussed advance care planning with patient; information given to patient to review. 11/2/2012          Hypertension goal BP (blood pressure) < 140/90 06/24/2011     Priority: Medium     HYPERLIPIDEMIA LDL GOAL <100 10/31/2010     Priority: Medium     Hypertrophy of prostate without urinary obstruction 11/15/2003     Priority: Medium     Problem list name updated by automated process. Provider to review       CKD (chronic kidney disease) stage 3, GFR 30-59  ml/min 06/24/2011     Priority: Low        Past Medical History:    Past Medical History:   Diagnosis Date     Abdominal aneurysm without mention of rupture 2007     Acute respiratory failure (H) 06/26/08     Anemia      BPH (benign prostatic hypertrophy)      CAD (coronary artery disease) 9/13/2011     Chronic airway obstruction, not elsewhere classified      CKD (chronic kidney disease) stage 3, GFR 30-59 ml/min      Hyperlipidemia      Nasal septal perforation      Nonischemic cardiomyopathy (H)      Obstructive chronic bronchitis with exacerbation (H) 07/04/08     Personal history of peptic ulcer disease      Pulmonary hypertension echo 2/10     Thrombocytopenia (H)      Thrombocytopenia (H) 6/24/2011     Unspecified essential hypertension        Past Surgical History:    Past Surgical History:   Procedure Laterality Date     AAA REPAIR  10/01/2007     COLONOSCOPY  10/12/2011    Procedure:COMBINED COLONOSCOPY, SINGLE BIOPSY/POLYPECTOMY BY BIOPSY; Colonoscopy, Polypectomy by Biopsy; Surgeon:MARK ALVAREZ; Location: GI      CYSTOURETHROSCOPY        UGI ENDOSCOPY DIAG W OR W/O BRUSH/WASH  1991     HC VASECTOMY UNILAT/BILAT W POSTOP SEMEN      Vasectomy     LAPAROSCOPIC APPENDECTOMY  6/24/2011    Procedure:LAPAROSCOPIC APPENDECTOMY; Surgeon:WOODROW MORENO; Location: OR       Family History:    Family History   Problem Relation Age of Onset     Respiratory Father      emphysema     Respiratory Brother      emphysema       Social History:  Marital Status:   [2]  Social History   Substance Use Topics     Smoking status: Former Smoker     Packs/day: 1.00     Years: 50.00     Quit date: 8/24/2007     Smokeless tobacco: Never Used      Comment: Quit      Alcohol use 0.0 oz/week     0 Standard drinks or equivalent per week      Comment: occasionally        Medications:      COMBIVENT RESPIMAT  MCG/ACT inhaler   terazosin (HYTRIN) 2 MG capsule   lisinopril (PRINIVIL/ZESTRIL) 2.5 MG tablet    lisinopril (PRINIVIL/ZESTRIL) 5 MG tablet   atorvastatin (LIPITOR) 10 MG tablet   tiotropium (SPIRIVA HANDIHALER) 18 MCG capsule   ALPRAZolam (XANAX) 0.25 MG tablet   diltiazem (CARDIZEM) 120 MG tablet   budesonide (PULMICORT) 1 MG/2ML SUSP neb solution   sildenafil (VIAGRA) 100 MG cap/tab   BROVANA 15 MCG/2ML NEBU neb solution   order for DME   Coenzyme Q10 (COQ-10 PO)   Placebo (ORDER FOR DME)   MULTI-VITAMIN OR         Review of Systems   Constitutional: Negative for chills and fever.   Eyes: Negative for visual disturbance.   Respiratory: Positive for shortness of breath (chronic).    Cardiovascular: Negative for chest pain.   Neurological: Negative for dizziness, weakness, light-headedness and numbness.   All other systems reviewed and are negative.      Physical Exam   BP: (!) 173/128  Pulse: 103  Resp: 24  SpO2: 93 %    Physical Exam   Constitutional: He is oriented to person, place, and time. He appears well-developed and well-nourished. No distress. Nasal cannula in place.   HENT:   Head: Normocephalic and atraumatic.   Right Ear: External ear normal.   Left Ear: External ear normal.   Mouth/Throat: Uvula is midline, oropharynx is clear and moist and mucous membranes are normal. No oropharyngeal exudate, posterior oropharyngeal edema or posterior oropharyngeal erythema.   Eyes: Conjunctivae are normal. No scleral icterus.   Neck: Normal range of motion. Neck supple.   Renal bruit noted of left side of neck   Cardiovascular: Regular rhythm, normal heart sounds and intact distal pulses.  Tachycardia present.  Exam reveals no gallop and no friction rub.    No murmur heard.  Pulmonary/Chest: Effort normal. No respiratory distress. He has decreased breath sounds (throughout). He has no wheezes. He has no rhonchi. He has no rales.   Abdominal: Soft. Bowel sounds are normal. He exhibits no distension. There is no tenderness. There is no rigidity, no rebound and no guarding.   Lymphadenopathy:     He has no  cervical adenopathy.   Neurological: He is alert and oriented to person, place, and time.   Skin: Skin is warm and dry. No rash noted. No pallor.   Psychiatric: He has a normal mood and affect. His behavior is normal.   Nursing note and vitals reviewed.    ED Course     ED Course     Procedures                   On 9/15/17 he had a CT of the whole body. The report showed no abnormal adenopathy. Lungs showed a severe centrilobular emphysema changes bilaterally. Traction bronchiectasis is seen bilaterally as well. Patulous esophagus with a small hiatal hernia. New thickening of the wall of the distal esophagus measuring up to 10 mm. A 4 mm nodule to the right upper lobe is noted. A stable 5 x 6 mm nodular opacity located in the right lower lobe. A stable irregular node measuring 6 x 2 mm. There's multilevel degenerative changes at the spine. Infrarenal aortic sac measured up to 5 mm in diameter increased from 3.9 mm in 2016.  Partially visualized abdominal aortic endo graft. Type II Endogaft. Scattered atherosclerosis. Significant narrowing of renal artery at the origin. Recommending correlation endoscopy. LAURO included. Prostate enlarged with heterozygous 5 cm in transverse diameter. Ovulating bladder at 2.9 x 1.9 mm.  Lobulated masses along left margin of bladder measuring 2.0 x 1.5 mm. Mild pancreatic duct ecstasies to the level of ampulla.   Normal adrenal glands.    Results for orders placed or performed during the hospital encounter of 03/14/18 (from the past 24 hour(s))   CBC with platelets differential   Result Value Ref Range    WBC 6.5 4.0 - 11.0 10e9/L    RBC Count 5.03 4.4 - 5.9 10e12/L    Hemoglobin 14.7 13.3 - 17.7 g/dL    Hematocrit 46.1 40.0 - 53.0 %    MCV 92 78 - 100 fl    MCH 29.2 26.5 - 33.0 pg    MCHC 31.9 31.5 - 36.5 g/dL    RDW 13.7 10.0 - 15.0 %    Platelet Count 163 150 - 450 10e9/L    Diff Method Automated Method     % Neutrophils 67.0 %    % Lymphocytes 25.0 %    % Monocytes 7.0 %    %  Eosinophils 1.0 %    Absolute Neutrophil 4.3 1.6 - 8.3 10e9/L    Absolute Lymphocytes 1.6 0.8 - 5.3 10e9/L    Absolute Monocytes 0.5 0.0 - 1.3 10e9/L    Absolute Eosinophils 0.1 0.0 - 0.7 10e9/L   Comprehensive metabolic panel   Result Value Ref Range    Sodium 140 133 - 144 mmol/L    Potassium 4.2 3.4 - 5.3 mmol/L    Chloride 105 94 - 109 mmol/L    Carbon Dioxide 28 20 - 32 mmol/L    Anion Gap 7 3 - 14 mmol/L    Glucose 98 70 - 99 mg/dL    Urea Nitrogen 25 7 - 30 mg/dL    Creatinine 1.42 (H) 0.66 - 1.25 mg/dL    GFR Estimate 48 (L) >60 mL/min/1.7m2    GFR Estimate If Black 58 (L) >60 mL/min/1.7m2    Calcium 9.0 8.5 - 10.1 mg/dL    Bilirubin Total 0.5 0.2 - 1.3 mg/dL    Albumin 4.1 3.4 - 5.0 g/dL    Protein Total 8.0 6.8 - 8.8 g/dL    Alkaline Phosphatase 92 40 - 150 U/L    ALT 21 0 - 70 U/L    AST 26 0 - 45 U/L   Troponin I   Result Value Ref Range    Troponin I ES 0.025 0.000 - 0.045 ug/L   Nt probnp inpatient (BNP)   Result Value Ref Range    N-Terminal Pro BNP Inpatient 819 0 - 1800 pg/mL   CRP inflammation   Result Value Ref Range    CRP Inflammation 3.1 0.0 - 8.0 mg/L   TSH with free T4 reflex   Result Value Ref Range    TSH 2.24 0.40 - 4.00 mU/L       Medications - No data to display    Assessments & Plan (with Medical Decision Making)  Ion Johnson is an 78-year-old male presenting to the ED for evaluation of high blood pressure has been experiencing at home.  Does me his sphygmomanometer readings for the last several days and they have all been in the high 190s over 100s.  Patient recently had some of his medications changed around including the stoppage of his terazosin which was replaced by Flomax for his BPH as well as adjustment of his lisinopril.  Since then, he has had more difficulty keeping his pressure under control.  I reviewed some of his medical records from the [facility] and it shows on 9/15/2017 he had a CT of the body with several significant findings including for  pulmonary nodules, thickening of the distal esophagus, a liver lesion, and a mass in the bladder.  In addition to this they identified 1 of the renal arteries being significantly stenosed greater than 50% at the origin, however, they did not identify which side.  This certainly could be playing a role in his difficult to control hypertension.  I did draw cortisol and renin levels, however, they are not going to be back for a day or 2.  The remainder of his blood work looked normal including a CBC, comprehensive metabolic panel, troponin I, N-terminal proBNP, C-reactive protein, and TSH.  The only significant abnormality was a creatinine of 1.42 and a GFR of 48.  Patient is known to have chronic renal failure, stage III.  At this point, I recommend we increase his lisinopril to 15 mg daily and I would like him to follow-up with his clinician that has been managing his blood pressure at the [facility] in [place].  In addition, he needs to get the workup completed on the distal esophageal thickening as this may be esophageal cancer.  The patient did serve in the [employer] during the [place] War and was exposed to copious amounts of agent orange antifolage spray which is linked as a strong carcinogen.  He is already undergone several treatments for different cancers and I suspect the esophageal thickening may be esophageal cancer.  I did review with him indications to return to the ED for reevaluation.  All questions from the patient were answered and he was suitable for discharge in satisfactory condition.     I have reviewed the nursing notes.    I have reviewed the findings, diagnosis, plan and need for follow up with the patient.       Discharge Medication List as of 3/14/2018 10:17 PM          Final diagnoses:   Hypertension goal BP (blood pressure) < 140/90   CKD (chronic kidney disease) stage 3, GFR 30-59 ml/min     This document serves as a record of services personally performed by Dudley  Malik BUTCHER MD. It was created on their behalf by Elizabeth Rao, a trained medical scribe. The creation of this record is based on the provider's personal observations and the statements of the patient. This document has been checked and approved by the attending provider.    Note: Chart documentation done in part with Dragon Voice Recognition software. Although reviewed after completion, some word and grammatical errors may remain.      3/14/2018   [facility] EMERGENCY DEPARTMENT     Malik Buckner MD  03/15/18 0143

## 2018-05-30 NOTE — PROGRESS NOTES
[facility]  [address] [facility] [address]  [phone]  Dept: [phone]    PRE-OP EVALUATION:  Today's date: 2018    Ion Johnson (: 1940) presents for pre-operative evaluation assessment as requested by Dr. Solitario.  He requires evaluation and anesthesia risk assessment prior to undergoing surgery/procedure for treatment of Bladder .    Proposed Surgery/ Procedure: Bladder tumor removal  Date of Surgery/ Procedure: 18  Time of Surgery/ Procedure: [facility]/Surgical Facility: [facility]  Fax number for surgical facility: Patient has a recurrent bladder tumor and has had this removed  Primary Physician: Jimmie Kennedy  Type of Anesthesia Anticipated: General    Patient has a Health Care Directive or Living Will:  YES has a copy at home    1. YES - DO YOU HAVE A HISTORY OF HEART ATTACK, STROKE, STENT, BYPASS OR SURGERY ON AN ARTERY IN THE HEAD, NECK, HEART OR LEG? AAA stent  2. NO - Do you ever have any pain or discomfort in your chest?  3. NO - Do you have a history of  Heart Failure?  4. YES - ARE YOUR TROUBLED BY SHORTNESS OF BREATH WHEN WALKING ON THE LEVEL, UP A SLIGHT HILL OR AT NIGHT? Has COPD  5. NO - Do you currently have a cold, bronchitis or other respiratory infection?  6. YES - DO YOU HAVE A COUGH, SHORTNESS OF BREATH OR WHEEZING? Has COPD  7. NO - Do you sometimes get pains in the calves of your legs when you walk?  8. NO - Do you or anyone in your family have previous history of blood clots?  9. NO - Do you or does anyone in your family have a serious bleeding problem such as prolonged bleeding following surgeries or cuts?  10. YES - HAVE YOU EVER HAD PROBLEMS WITH ANEMIA OR BEEN TOLD TO TAKE IRON PILLS? On Iron- for AAA stent  11. NO - Have you had any abnormal blood loss such as black, tarry or bloody stools, or abnormal vaginal bleeding?  12. NO - Have you ever had a blood transfusion?  13. NO - Have you or any of your  relatives ever had problems with anesthesia?  14. NO - Do you have sleep apnea, excessive snoring or daytime drowsiness?  15. NO - Do you have any prosthetic heart valves?  16. NO - Do you have prosthetic joints?  17. NO - Is there any chance that you may be pregnant?      HPI:     HPI related to upcoming procedure: Patient has a recurrent bladder tumor that has been endoscopically removed several times.  Recurrence was once again noted and the patient is anticipating cystoscopy with bladder tumor removal.  He has severe chronic obstructive pulmonary disease and is a poor surgical candidate for more aggressive surgery.      See problem list for active medical problems.  Problems all longstanding and stable, except as noted/documented.  See ROS for pertinent symptoms related to these conditions.                                                                                                                                                          .    MEDICAL HISTORY:     Patient Active Problem List    Diagnosis Date Noted     Chronic airway obstruction (H) 06/23/2004     Priority: High     Problem list name updated by automated process. Provider to review       Anxiety 05/02/2017     Priority: Medium     CHF (congestive heart failure) (H) 09/17/2013     Priority: Medium     Advanced directives, counseling/discussion 11/02/2012     Priority: Medium     .Discussed advance care planning with patient; information given to patient to review. 11/2/2012          Hypertension goal BP (blood pressure) < 140/90 06/24/2011     Priority: Medium     HYPERLIPIDEMIA LDL GOAL <100 10/31/2010     Priority: Medium     Hypertrophy of prostate without urinary obstruction 11/15/2003     Priority: Medium     Problem list name updated by automated process. Provider to review       CKD (chronic kidney disease) stage 3, GFR 30-59 ml/min 06/24/2011     Priority: Low      Past Medical History:   Diagnosis Date     Abdominal aneurysm without  mention of rupture 2007    Infrarenal AAA repair     Acute respiratory failure (H) 06/26/08    Transfer 7/4 to [facility] for further care of severe COPD.     Anemia      BPH (benign prostatic hypertrophy)      CAD (coronary artery disease) 9/13/2011     Chronic airway obstruction, not elsewhere classified     COPD     CKD (chronic kidney disease) stage 3, GFR 30-59 ml/min      Hyperlipidemia      Nasal septal perforation      Nonischemic cardiomyopathy (H)     EF subsequently normalized with med management     Obstructive chronic bronchitis with exacerbation (H) 07/04/08     Personal history of peptic ulcer disease      Pulmonary hypertension echo 2/10    mild-moderate     Thrombocytopenia (H)      Thrombocytopenia (H) 6/24/2011     Unspecified essential hypertension      Past Surgical History:   Procedure Laterality Date     AAA REPAIR  10/01/2007     COLONOSCOPY  10/12/2011    Procedure:COMBINED COLONOSCOPY, SINGLE BIOPSY/POLYPECTOMY BY BIOPSY; Colonoscopy, Polypectomy by Biopsy; Surgeon:MARK ALVAREZ; Location: GI      CYSTOURETHROSCOPY        UGI ENDOSCOPY DIAG W OR W/O BRUSH/WASH  1991     HC VASECTOMY UNILAT/BILAT W POSTOP SEMEN      Vasectomy     LAPAROSCOPIC APPENDECTOMY  6/24/2011    Procedure:LAPAROSCOPIC APPENDECTOMY; Surgeon:WOODROW MORENO; Location: OR     Current Outpatient Prescriptions   Medication Sig Dispense Refill     ALPRAZolam (XANAX) 0.25 MG tablet Take 1 tablet (0.25 mg) by mouth 3 times daily as needed for anxiety 90 tablet 0     atorvastatin (LIPITOR) 10 MG tablet Take 1 tablet (10 mg) by mouth daily 90 tablet 3     BROVANA 15 MCG/2ML NEBU neb solution Take 2 mLs (15 mcg) by nebulization 2 times daily 120 mL 11     budesonide (PULMICORT) 1 MG/2ML SUSP neb solution Take 2 mLs (1 mg) by nebulization 2 times daily 360 mL 3     Coenzyme Q10 (COQ-10 PO) Reported on 4/26/2017       COMBIVENT RESPIMAT  MCG/ACT inhaler USE 1 INHALATION FOUR TIMES A DAY, DO NOT EXCEED 6  INHALATIONS PER DAY 24 g 0     diltiazem (CARDIZEM) 120 MG tablet Take 1 tablet (120 mg) by mouth daily 1 tablet daily 90 tablet 3     lisinopril (PRINIVIL/ZESTRIL) 5 MG tablet Take 3 tablets (15 mg) by mouth 2 times daily 180 tablet 3     MULTI-VITAMIN OR Reported on 4/26/2017       order for DME Equipment being ordered: Oxygen with portability and all necessary accessories 1 Device 0     Placebo (ORDER FOR DME) Equipment being ordered: Portable Nebulizer machine ([employer] Micro-Air Electronic Nebulizer System [id number]   1 each 0     sildenafil (VIAGRA) 100 MG cap/tab Take 1 tablet (100 mg) by mouth daily as needed 10 tablet 11     terazosin (HYTRIN) 2 MG capsule Take 1 capsule (2 mg) by mouth At Bedtime 30 capsule 1     tiotropium (SPIRIVA HANDIHALER) 18 MCG capsule Inhale contents of one capsule daily. 90 capsule 3     [DISCONTINUED] lisinopril (PRINIVIL/ZESTRIL) 2.5 MG tablet Take 1 tablet (2.5 mg) by mouth 2 times daily To take along with lisinopril 5 mg for total of lisinopril 7.5 mg twice daily 180 tablet 3     [DISCONTINUED] lisinopril (PRINIVIL/ZESTRIL) 5 MG tablet Take 1 tablet (5 mg) by mouth 2 times daily 180 tablet 3     OTC products: None, except as noted above    Allergies   Allergen Reactions     Ativan      Benzyl Alcohol      Bupropion Hcl Hives and Swelling     Wellbutrin.  THROAT SWELLING.     Haldol [Butyrophenones]      Possible allergy     Levaquin [Quinolones] Swelling     ANGIOEDEMA, THROAT SWELLING RESULTING IN INTUBATION.     Medrol [Methylprednisolone]      Possible allergy      Latex Allergy: NO    Social History   Substance Use Topics     Smoking status: Former Smoker     Packs/day: 1.00     Years: 50.00     Quit date: 8/24/2007     Smokeless tobacco: Never Used      Comment: Quit      Alcohol use 0.0 oz/week     0 Standard drinks or equivalent per week      Comment: occasionally     History   Drug Use No       REVIEW OF SYSTEMS:   CONSTITUTIONAL: NEGATIVE for fever, chills, change in  "weight  INTEGUMENTARY/SKIN: NEGATIVE for worrisome rashes, moles or lesions  EYES: NEGATIVE for vision changes or irritation  ENT/MOUTH: NEGATIVE for ear, mouth and throat problems  RESP: Positive for shortness of breath with any exertion.  Patient is on multiple nebulizer treatments with good response.  No recent purulent sputum or hemoptysis noted  CV: NEGATIVE for chest pain, palpitations or peripheral edema  GI: NEGATIVE for nausea, abdominal pain, heartburn, or change in bowel habits   male : Negative for recent hematuria.  Bladder tumor noted on cystoscopy  MUSCULOSKELETAL: NEGATIVE for significant arthralgias or myalgia  NEURO: NEGATIVE for weakness, dizziness or paresthesias  ENDOCRINE: NEGATIVE for temperature intolerance, skin/hair changes  HEME: NEGATIVE for bleeding problems  PSYCHIATRIC: NEGATIVE for changes in mood or affect    EXAM:   BP (!) 158/102 (BP Location: Left arm, Patient Position: Chair, Cuff Size: Adult Regular)  Pulse 84  Temp 98  F (36.7  C) (Temporal)  Ht 5' 6\" (1.676 m)  Wt 131 lb 9.6 oz (59.7 kg)  SpO2 93%  BMI 21.24 kg/m2    GENERAL APPEARANCE: healthy, alert and no distress     EYES: EOMI,  PERRL     HENT: ear canals and TM's normal and nose and mouth without ulcers or lesions     NECK: no adenopathy, no asymmetry, masses, or scars and thyroid normal to palpation     RESP: Breath sounds are markedly decreased in all fields.  Scant wheezing is heard.  No significant intercostal retraction present.     CV: Bigeminal pattern is noted , normal S1 S2, no S3 or S4 and no murmur, click or rub.  EKG demonstrates atrial bigeminy     ABDOMEN:  soft, nontender, no HSM or masses and bowel sounds normal     MS: extremities normal- no gross deformities noted, no evidence of inflammation in joints, FROM in all extremities.     SKIN: no suspicious lesions or rashes     NEURO: Normal strength and tone, sensory exam grossly normal, mentation intact and speech normal     PSYCH: mentation " appears normal. and affect normal/bright     LYMPHATICS: No cervical adenopathy    DIAGNOSTICS:   EKG: Normal sinus rhythm with atrial bigeminy is noted, normal axis, normal intervals, no acute ST/T changes c/w ischemia, no LVH by voltage criteria, unchanged from previous tracings  Lab work pending.  Will check BMP.  Remaining lab work is obtained from previous ER visit and is reviewed and noted to be unremarkable    IMPRESSION:   Reason for surgery/procedure: Recurrent bladder tumor requiring resection endoscopically  Diagnosis/reason for consult: Perioperative risk assessment in a pleasant 78-year-old male with:  1. Preop general physical exam    2. Malignant neoplasm of urinary bladder, unspecified site (H)    3. Hypertension goal BP (blood pressure) < 140/90  - lisinopril (PRINIVIL/ZESTRIL) 5 MG tablet; Take 3 tablets (15 mg) by mouth 2 times daily  Dispense: 180 tablet; Refill: 3  - Basic metabolic panel  - EKG 12-lead complete w/read - Clinics    4. Chronic obstructive pulmonary disease, unspecified COPD type (H)      The proposed surgical procedure is considered LOW risk.    REVISED CARDIAC RISK INDEX  The patient has the following serious cardiovascular risks for perioperative complications such as (MI, PE, VFib and 3  AV Block):  No serious cardiac risks  INTERPRETATION: 0 risks: Class I (very low risk - 0.4% complication rate)    The patient has the following additional risks for perioperative complications:  No identified additional risks      ICD-10-CM    1. Preop general physical exam Z01.818    2. Hypertension goal BP (blood pressure) < 140/90 I10 lisinopril (PRINIVIL/ZESTRIL) 5 MG tablet       RECOMMENDATIONS:         --Patient is to take all scheduled medications on the day of surgery EXCEPT for modifications listed below.  Patient will not take the lisinopril the morning of the procedure.  Patient is currently not on any aspirin or antiplatelet therapy        APPROVAL GIVEN to proceed with proposed  procedure, without further diagnostic evaluation       Signed Electronically by: Jimmie Kennedy DO    Copy of this evaluation report is provided to requesting physician.    [place] Preop Guidelines    Revised Cardiac Risk Index

## 2018-05-30 NOTE — MR AVS SNAPSHOT
After Visit Summary   5/30/2018    Ion Johnson    MRN: [id number]           Patient Information     Date Of Birth          1940        Visit Information        Provider Department      5/30/2018 9:40 AM Jimmie Kennedy DO [facility]        Today's Diagnoses     Preop general physical exam    -  1    Malignant neoplasm of urinary bladder, unspecified site (H)        Hypertension goal BP (blood pressure) < 140/90        Chronic obstructive pulmonary disease, unspecified COPD type (H)          Care Instructions      Before Your Surgery      Call your surgeon if there is any change in your health. This includes signs of a cold or flu (such as a sore throat, runny nose, cough, rash or fever).    Do not smoke, drink alcohol or take over the counter medicine (unless your surgeon or primary care doctor tells you to) for the 24 hours before and after surgery.    If you take prescribed drugs: Follow your doctor s orders about which medicines to take and which to stop until after surgery.    Eating and drinking prior to surgery: follow the instructions from your surgeon    Take a shower or bath the night before surgery. Use the soap your surgeon gave you to gently clean your skin. If you do not have soap from your surgeon, use your regular soap. Do not shave or scrub the surgery site.  Wear clean pajamas and have clean sheets on your bed.           Follow-ups after your visit        Follow-up notes from your care team     Return if symptoms worsen or fail to improve.      Who to contact     If you have questions or need follow up information about today's clinic visit or your schedule please contact [facility] directly at [phone].  Normal or non-critical lab and imaging results will be communicated to you by MyChart, letter or phone within 4 business days after the clinic has received the results. If you do not hear from us within 7 days, please contact the  "clinic through [employer] or phone. If you have a critical or abnormal lab result, we will notify you by phone as soon as possible.  Submit refill requests through [employer] or call your pharmacy and they will forward the refill request to us. Please allow 3 business days for your refill to be completed.          Additional Information About Your Visit        [employer] Information     [employer] gives you secure access to your electronic health record. If you see a primary care provider, you can also send messages to your care team and make appointments. If you have questions, please call your primary care clinic.  If you do not have a primary care provider, please call [phone] and they will assist you.        Care EveryWhere ID     This is your Care EveryWhere ID. This could be used by other organizations to access your [place] medical records  [id number]        Your Vitals Were     Pulse Temperature Height Pulse Oximetry BMI (Body Mass Index)       84 98  F (36.7  C) (Temporal) 5' 6\" (1.676 m) 93% 21.24 kg/m2        Blood Pressure from Last 3 Encounters:   05/30/18 (!) 158/102   03/14/18 (!) 159/95   07/31/17 (!) 185/96    Weight from Last 3 Encounters:   05/30/18 131 lb 9.6 oz (59.7 kg)   08/25/17 124 lb 4.8 oz (56.4 kg)   07/31/17 122 lb 6.4 oz (55.5 kg)              We Performed the Following     Basic metabolic panel     EKG 12-lead complete w/read - Clinics          Today's Medication Changes          These changes are accurate as of 5/30/18 11:10 AM.  If you have any questions, ask your nurse or doctor.               These medicines have changed or have updated prescriptions.        Dose/Directions    lisinopril 5 MG tablet   Commonly known as:  PRINIVIL/ZESTRIL   This may have changed:    - how much to take  - Another medication with the same name was removed. Continue taking this medication, and follow the directions you see here.   Used for:  Hypertension goal BP (blood pressure) < 140/90   Changed by:  " Jimmie Kennedy DO        Dose:  15 mg   Take 3 tablets (15 mg) by mouth 2 times daily   Quantity:  180 tablet   Refills:  3                Primary Care Provider Office Phone # Fax #    Jimmie Kennedy  [phone]       [address] [facility] [address]        Equal Access to Services     KYLAH ROGER : Hadii aad ku hadasho Soomaali, waaxda luqadaha, qaybta kaalmada adeegyada, waxay vinin hayaan adetroy hungmary kaydoyle larodney . So [facility] [phone].    ATENCIÓN: Si habla español, tiene a john disposición servicios gratuitos de asistencia lingüística. Llame al [phone].    We comply with applicable federal civil rights laws and [place] laws. We do not discriminate on the basis of race, color, national origin, age, disability, sex, sexual orientation, or gender identity.            Thank you!     Thank you for choosing [facility]  for your care. Our goal is always to provide you with excellent care. Hearing back from our patients is one way we can continue to improve our services. Please take a few minutes to complete the written survey that you may receive in the mail after your visit with us. Thank you!             Your Updated Medication List - Protect others around you: Learn how to safely use, store and throw away your medicines at www.disposemymeds.org.          This list is accurate as of 5/30/18 11:10 AM.  Always use your most recent med list.                   Brand Name Dispense Instructions for use Diagnosis    ALPRAZolam 0.25 MG tablet    XANAX    90 tablet    Take 1 tablet (0.25 mg) by mouth 3 times daily as needed for anxiety    Anxiety       atorvastatin 10 MG tablet    LIPITOR    90 tablet    Take 1 tablet (10 mg) by mouth daily    Hyperlipidemia LDL goal <100, Hypertension goal BP (blood pressure) < 140/90, Chronic obstructive pulmonary disease, unspecified COPD type (H), Hypertrophy of prostate without urinary obstruction, Congestive heart failure,  unspecified congestive heart failure chronicity, unspecified congestive heart failure type (H), CKD (chronic kidney disease) stage 3, GFR 30-59 ml/min       BROVANA 15 MCG/2ML Nebu neb solution   Generic drug:  arformoterol     120 mL    Take 2 mLs (15 mcg) by nebulization 2 times daily    Chronic obstructive pulmonary disease, unspecified COPD type (H)       budesonide 1 MG/2ML Susp neb solution    PULMICORT    360 mL    Take 2 mLs (1 mg) by nebulization 2 times daily    Emphysematous bleb (H)       COMBIVENT RESPIMAT  MCG/ACT inhaler   Generic drug:  Ipratropium-Albuterol     24 g    USE 1 INHALATION FOUR TIMES A DAY, DO NOT EXCEED 6 INHALATIONS PER DAY    Hyperlipidemia LDL goal <100, Hypertension goal BP (blood pressure) < 140/90, Chronic obstructive pulmonary disease, unspecified COPD type (H), Hypertrophy of prostate without urinary obstruction, Congestive heart failure, unspecified congestive heart failure chronicity, unspecified congestive heart failure type (H), CKD (chronic kidney disease) stage 3, GFR 30-59 ml/min       COQ-10 PO      Reported on 4/26/2017        diltiazem 120 MG tablet    CARDIZEM    90 tablet    Take 1 tablet (120 mg) by mouth daily 1 tablet daily    Congestive heart failure, unspecified congestive heart failure chronicity, unspecified congestive heart failure type (H), Hypertension goal BP (blood pressure) < 140/90       lisinopril 5 MG tablet    PRINIVIL/ZESTRIL    180 tablet    Take 3 tablets (15 mg) by mouth 2 times daily    Hypertension goal BP (blood pressure) < 140/90       MULTI-VITAMIN PO      Reported on 4/26/2017        order for DME     1 each    Equipment being ordered: Portable Nebulizer machine ([employer] Micro-Air Electronic Nebulizer System [id number]    Chronic airway obstruction, not elsewhere classified       order for DME     1 Device    Equipment being ordered: Oxygen with portability and all necessary accessories    Congestive heart failure, unspecified congestive  heart failure chronicity, unspecified congestive heart failure type (H), Chronic obstructive pulmonary disease, unspecified COPD type (H)       sildenafil 100 MG tablet    VIAGRA    10 tablet    Take 1 tablet (100 mg) by mouth daily as needed    Hyperlipidemia LDL goal <100, Hypertension goal BP (blood pressure) < 140/90, Chronic obstructive pulmonary disease, unspecified COPD type (H), Hypertrophy of prostate without urinary obstruction, Congestive heart failure, unspecified congestive heart failure chronicity, unspecified congestive heart failure type (H), CKD (chronic kidney disease) stage 3, GFR 30-59 ml/min       terazosin 2 MG capsule    HYTRIN    30 capsule    Take 1 capsule (2 mg) by mouth At Bedtime    Hypertrophy of prostate without urinary obstruction       tiotropium 18 MCG capsule    SPIRIVA HANDIHALER    90 capsule    Inhale contents of one capsule daily.    Chronic obstructive pulmonary disease, unspecified COPD type (H)

## 2018-06-05 NOTE — PROGRESS NOTES
Dear Ion, your recent test results are attached.  The chemistry panel shows some modest improvement in the kidney function.  The sugar and potassium are normal.    Feel free to contact me via the office or My Chart if you have any questions regarding the above.  Sincerely,  DO ROEL Boone

## 2018-07-28 NOTE — TELEPHONE ENCOUNTER
"    Reason for Disposition    [1] Symptoms of pill stuck in throat or esophagus (e.g., pain in throat or chest, FB sensation) AND [2] no relief after using CARE ADVICE    Additional Information    Negative: Any difficulty breathing (e.g., coughing, wheezing or stridor)    Negative: Sounds like a life-threatening emergency to the triager    Negative: Choked on or inhaled food or foreign body (but did not swallow it)    Negative: Symptoms of blocked esophagus (e.g., can't swallow normal secretions, drooling)    Negative: Symptoms of FB stuck in throat or esophagus (e.g., continued pain in throat or chest, FB sensation, blood-tinged saliva) (Exception: pill stuck)    Negative: Can't swallow water or bread    Negative: Sharp object  (e.g., needle, nail, safety pin, toothpick, bone, bottle cap, pull tab, dental bridge work)     (Exception: tiny chips of glass generally pass without any symptoms)    Negative: Battery of any type    Negative: Magnet    Negative: Packet of drugs (e.g., condom filled with cocaine)    Negative: Swallowed 2 or more FBs (e.g., multiple objects)    Negative: Swallowed a poisonous object (e.g., a lead sinker or a bullet)    Negative: Swallowed a (non-food) FB on purpose    Negative: SEVERE symptoms of pill stuck in throat or esophagus (e.g., severe pain, bleeding, or inability to swallow liquids)    Negative: [1] Abdominal pain AND [2] FB hasn't passed    Negative: Blood in the stools    Negative: [1] Vomited 2 or more times AND [2] FB hasn't passed    Negative: Patient sounds very sick or weak to the triager    Negative: Patient is confused or is an unreliable provider of information (e.g., dementia, profound mental retardation, alcohol intoxication)    Answer Assessment - Initial Assessment Questions  1. OBJECT: \"What is it?\"       Large med capsule  2. SIZE: \"How large is it?\" (inches or cm, or compare it to standard coins)       3/4 inch long  3. ONSET: \"How long ago did you swallow it?\" " "(e.g., minutes, hours)       0900  4. MECHANISM: \"Tell me how it happened.\"       Swallowed med  5. OTHER SYMPTOMS: \"Are there any other symptoms?\" (e.g., pain in neck or chest, difficulty breathing, difficulty swallowing)      Denies, able to swallow food around it  6. PREGNANCY: \"Is there any chance you are pregnant?\" \"When was your last menstrual period?\"      no    Protocols used: SWALLOWED FOREIGN BODY-ADULT-AH      "

## 2018-07-28 NOTE — ED PROVIDER NOTES
History     Chief Complaint   Patient presents with     Swallowed Foreign Body     HPI  Ion Johnson is a 78 year old male who presents emergency department complaining of foreign body sensation in the throat.  The patient took his supplement this morning at approximately 930 named beta-1, 3D glucan and felt like it got stuck in his throat.  He has a foreign body sensation in the middle of the throat.  He does not have any new shortness of breath or choking.  He has since been able to eat bread, drink fluids and has not been vomiting.  He is able to drink soda here in the emergency department.  He does not have any abdominal pain, shortness of breath or chest pain.  He called the nurse line who told him to go to UC or ER. He decided to come to ER bc they told him we would be able to do a scope and removed the pill.      Problem List:    Patient Active Problem List    Diagnosis Date Noted     Chronic airway obstruction (H) 06/23/2004     Priority: High     Problem list name updated by automated process. Provider to review       Anxiety 05/02/2017     Priority: Medium     CHF (congestive heart failure) (H) 09/17/2013     Priority: Medium     Advanced directives, counseling/discussion 11/02/2012     Priority: Medium     .Discussed advance care planning with patient; information given to patient to review. 11/2/2012          Hypertension goal BP (blood pressure) < 140/90 06/24/2011     Priority: Medium     HYPERLIPIDEMIA LDL GOAL <100 10/31/2010     Priority: Medium     Hypertrophy of prostate without urinary obstruction 11/15/2003     Priority: Medium     Problem list name updated by automated process. Provider to review       CKD (chronic kidney disease) stage 3, GFR 30-59 ml/min 06/24/2011     Priority: Low        Past Medical History:    Past Medical History:   Diagnosis Date     Abdominal aneurysm without mention of rupture 2007     Acute respiratory failure (H) 06/26/08     Anemia      BPH (benign prostatic  hypertrophy)      CAD (coronary artery disease) 9/13/2011     Chronic airway obstruction, not elsewhere classified      CKD (chronic kidney disease) stage 3, GFR 30-59 ml/min      Hyperlipidemia      Nasal septal perforation      Nonischemic cardiomyopathy (H)      Obstructive chronic bronchitis with exacerbation (H) 07/04/08     Personal history of peptic ulcer disease      Pulmonary hypertension echo 2/10     Thrombocytopenia (H)      Thrombocytopenia (H) 6/24/2011     Unspecified essential hypertension        Past Surgical History:    Past Surgical History:   Procedure Laterality Date     AAA REPAIR  10/01/2007     COLONOSCOPY  10/12/2011    Procedure:COMBINED COLONOSCOPY, SINGLE BIOPSY/POLYPECTOMY BY BIOPSY; Colonoscopy, Polypectomy by Biopsy; Surgeon:MARK ALVAREZ; Location:[facility]     HC CYSTOURETHROSCOPY       HC UGI ENDOSCOPY DIAG W OR W/O BRUSH/WASH  1991     HC VASECTOMY UNILAT/BILAT W POSTOP SEMEN      Vasectomy     LAPAROSCOPIC APPENDECTOMY  6/24/2011    Procedure:LAPAROSCOPIC APPENDECTOMY; Surgeon:WOODROW MORENO; Location:[facility]       Family History:    Family History   Problem Relation Age of Onset     Respiratory Father      emphysema     Respiratory Brother      emphysema       Social History:  Marital Status:   [2]  Social History   Substance Use Topics     Smoking status: Former Smoker     Packs/day: 1.00     Years: 50.00     Quit date: 8/24/2007     Smokeless tobacco: Never Used      Comment: Quit      Alcohol use 0.0 oz/week     0 Standard drinks or equivalent per week      Comment: occasionally        Medications:      ALPRAZolam (XANAX) 0.25 MG tablet   atorvastatin (LIPITOR) 10 MG tablet   BROVANA 15 MCG/2ML NEBU neb solution   budesonide (PULMICORT) 1 MG/2ML SUSP neb solution   Coenzyme Q10 (COQ-10 PO)   COMBIVENT RESPIMAT  MCG/ACT inhaler   diltiazem (CARDIZEM) 120 MG tablet   lisinopril (PRINIVIL/ZESTRIL) 5 MG tablet   MULTI-VITAMIN OR   order for DME   Placebo (ORDER FOR  DME)   sildenafil (VIAGRA) 100 MG cap/tab   terazosin (HYTRIN) 2 MG capsule   tiotropium (SPIRIVA HANDIHALER) 18 MCG capsule         Review of Systems   All other systems reviewed and are negative.      Physical Exam   BP: 137/58  Pulse: 85  Temp: 98.6  F (37  C)  Resp: 22  Weight: 54.4 kg (120 lb)  SpO2: 99 %      Physical Exam   Constitutional: He appears well-developed. No distress.   Thin   HENT:   Head: Normocephalic and atraumatic.   Eyes: EOM are normal. Pupils are equal, round, and reactive to light. No scleral icterus.   Neck: Normal range of motion. Neck supple.   Cardiovascular: Normal rate and normal heart sounds.    No murmur heard.  Pulmonary/Chest:   Tachypnea, on oxygen, this is his baseline   Abdominal: Soft.   Musculoskeletal: Normal range of motion. He exhibits no edema, tenderness or deformity.   Neurological: He is alert. No cranial nerve deficit.   Skin: Skin is warm and dry. No rash noted. He is not diaphoretic. No erythema. No pallor.   Psychiatric: He has a normal mood and affect. His behavior is normal.       ED Course     ED Course     Procedures  I discussed doing an xray and he didn't want to do it               No results found for this or any previous visit (from the past 24 hour(s)).    Medications - No data to display    Assessments & Plan (with Medical Decision Making)  He most likely has foreign body sensation in his esophagus although a pill could be stuck there.  He can swallow water and I do not think the pills cost takes are recommended an outpatient EGD.  He became somewhat upset about this was a waste of time because he was told to come here and that we would do a scope to remove the pill.  I tried to explain to him that we do not do EGDs out of the emergency department and the consultants would not do an emergency EGD if he was able to swallow water and food without difficulty.  Also it is doubtful that this pill is caustic and so an emergency EGD is not indicated at this  time.  He was quite upset and wanted to leave. The differential diagnosis, treatment options, risks and follow up discussed with a competent patient and/or competent family member who agrees with the plan.       I have reviewed the nursing notes.    I have reviewed the findings, diagnosis, plan and need for follow up with the patient.      Discharge Medication List as of 7/28/2018  3:32 PM          Final diagnoses:   Foreign body sensation in throat       7/28/2018   [facility] EMERGENCY DEPARTMENT     Scottie Pro MD  07/28/18 5811

## 2018-07-28 NOTE — ED AVS SNAPSHOT
[facility] Emergency Department    [address] [place] [address]    Phone:  [phone]    Fax:  [phone]                                       Ion Johnson   MRN: [id number]    Department:  [facility] Emergency Department   Date of Visit:  7/28/2018           Patient Information     Date Of Birth          1940        Your diagnoses for this visit were:     Foreign body sensation in throat        You were seen by Scottie Pro MD.      Follow-up Information     Follow up with Jimmie Kennedy DO.    Specialty:  Internal Medicine    Why:  ER follow up, If symptoms worsen    Contact information:    [address] [facility] [address]  [phone]          Discharge Instructions       If your symptoms continue follow-up with your doctor and consider having endoscopy as discussed.    24 Hour Appointment Hotline       To make an appointment at any [place] clinic, call [phone] ([phone]). If you don't have a family doctor or clinic, we will help you find one. [place] clinics are conveniently located to serve the needs of you and your family.             Review of your medicines      Our records show that you are taking the medicines listed below. If these are incorrect, please call your family doctor or clinic.        Dose / Directions Last dose taken    ALPRAZolam 0.25 MG tablet   Commonly known as:  XANAX   Dose:  0.25 mg   Quantity:  90 tablet        Take 1 tablet (0.25 mg) by mouth 3 times daily as needed for anxiety   Refills:  0        atorvastatin 10 MG tablet   Commonly known as:  LIPITOR   Dose:  10 mg   Quantity:  90 tablet        Take 1 tablet (10 mg) by mouth daily   Refills:  3        BROVANA 15 MCG/2ML Nebu neb solution   Dose:  15 mcg   Quantity:  120 mL   Generic drug:  arformoterol        Take 2 mLs (15 mcg) by nebulization 2 times daily   Refills:  11        budesonide 1 MG/2ML Susp neb solution   Commonly known as:  PULMICORT    Dose:  1 mg   Quantity:  360 mL        Take 2 mLs (1 mg) by nebulization 2 times daily   Refills:  3        COMBIVENT RESPIMAT  MCG/ACT inhaler   Quantity:  24 g   Generic drug:  Ipratropium-Albuterol        USE 1 INHALATION FOUR TIMES A DAY, DO NOT EXCEED 6 INHALATIONS PER DAY   Refills:  0        COQ-10 PO        Reported on 4/26/2017   Refills:  0        diltiazem 120 MG tablet   Commonly known as:  CARDIZEM   Dose:  120 mg   Quantity:  90 tablet        Take 1 tablet (120 mg) by mouth daily 1 tablet daily   Refills:  3        lisinopril 5 MG tablet   Commonly known as:  PRINIVIL/ZESTRIL   Dose:  15 mg   Quantity:  180 tablet        Take 3 tablets (15 mg) by mouth 2 times daily   Refills:  3        MULTI-VITAMIN PO        Reported on 4/26/2017   Refills:  0        order for DME   Quantity:  1 each        Equipment being ordered: Portable Nebulizer machine (Omron Micro-Air Electronic Nebulizer System [id number]   Refills:  0        order for DME   Quantity:  1 Device        Equipment being ordered: Oxygen with portability and all necessary accessories   Refills:  0        sildenafil 100 MG tablet   Commonly known as:  VIAGRA   Dose:  100 mg   Quantity:  10 tablet        Take 1 tablet (100 mg) by mouth daily as needed   Refills:  11        terazosin 2 MG capsule   Commonly known as:  HYTRIN   Dose:  2 mg   Quantity:  30 capsule        Take 1 capsule (2 mg) by mouth At Bedtime   Refills:  1        tiotropium 18 MCG capsule   Commonly known as:  SPIRIVA HANDIHALER   Quantity:  90 capsule        Inhale contents of one capsule daily.   Refills:  3                Orders Needing Specimen Collection     None      Pending Results     No orders found from 7/26/2018 to 7/29/2018.            Pending Culture Results     No orders found from 7/26/2018 to 7/29/2018.            Pending Results Instructions     If you had any lab results that were not finalized at the time of your Discharge, you can call the ED Lab Result RN  at [phone]. You will be contacted by this team for any positive Lab results or changes in treatment. The nurses are available 7 days a week from 10A to 6:30P.  You can leave a message 24 hours per day and they will return your call.        Thank you for choosing [facility]       Thank you for choosing [facility] for your care. Our goal is always to provide you with excellent care. Hearing back from our patients is one way we can continue to improve our services. Please take a few minutes to complete the written survey that you may receive in the mail after you visit with us. Thank you!        [employer] Information     [email or web address] gives you secure access to your electronic health record. If you see a primary care provider, you can also send messages to your care team and make appointments. If you have questions, please call your primary care clinic.  If you do not have a primary care provider, please call [phone] and they will assist you.        Care EveryWhere ID     This is your Care EveryWhere ID. This could be used by other organizations to access your [facility] medical records  [id number]        Equal Access to Services     KYLAH ROGER : Hadlittle Gramajo, wanikole mckeon, qaana rosa danielle, nancy an . So [facility] [phone].    ATENCIÓN: Si habla español, tiene a john disposición servicios gratuitos de asistencia lingüística. Llame al [phone].    We comply with applicable federal civil rights laws and [place] laws. We do not discriminate on the basis of race, color, national origin, age, disability, sex, sexual orientation, or gender identity.            After Visit Summary       This is your record. Keep this with you and show to your community pharmacist(s) and doctor(s) at your next visit.

## 2018-07-28 NOTE — ED AVS SNAPSHOT
[facility] Emergency Department    [address] [place] [address]    Phone:  [phone]    Fax:  [phone]                                       Ion Johnson   MRN: [id number]    Department:  [facility] Emergency Department   Date of Visit:  7/28/2018           After Visit Summary Signature Page     I have received my discharge instructions, and my questions have been answered. I have discussed any challenges I see with this plan with the nurse or doctor.    ..........................................................................................................................................  Patient/Patient Representative Signature      ..........................................................................................................................................  Patient Representative Print Name and Relationship to Patient    ..................................................               ................................................  Date                                            Time    ..........................................................................................................................................  Reviewed by Signature/Title    ...................................................              ..............................................  Date                                                            Time

## 2018-07-28 NOTE — ED NOTES
"Pt states \" I don't know why the doctor is doing the xray. He said he would not be able to get it out anyways\". I told pt I would talk to doctor to see what the plan is if a capsule is seen or not. Pt stated he would appriciate it. Dr Pro notified and in to see pt  "

## 2018-08-01 NOTE — LETTER
My COPD Action Plan     Name: Ion Johnson    YOB: 1940   Date: 8/1/2018    My doctor: Jimmie Kennedy, DO   My clinic: [address] [address] [phone]  My Controller Medicine:    Dose:      My Rescue Medicine:    Dose:      My Flare Up Medicine:    Dose:      My COPD Severity:       Use of Oxygen:      Make sure you've had your pneumonia   vaccines.          GREEN ZONE       Doing well today      Usual level of activity and exercise    Usual amount of cough and mucus    No shortness of breath    Usual level of health (thinking clearly, sleeping well, feel like eating) Actions:      Take daily medicines    Use oxygen as prescribed    Follow regular exercise and diet plan    Avoid cigarette smoke and other irritants that harm the lungs           YELLOW ZONE          Having a bad day or flare up      Short of breath more than usual    A lot more sputum (mucus) than usual    Sputum looks yellow, green, tan, brown or bloody    More coughing or wheezing    Fever or chills    Less energy; trouble completing activities    Trouble thinking or focusing    Using quick relief inhaler or nebulizer more often    Poor sleep; symptoms wake me up    Do not feel like eating Actions:      Get plenty of rest    Take daily medicines    Use quick relief inhaler every  hours    If you use oxygen, call you doctor to see if you should adjust your oxygen    Do breathing exercises or other things to help you relax    Let a loved one, friend or neighbor know you are feeling worse    Call your care team if you have 2 or more symptoms.  Start taking steroids or antibiotics if directed by your care team           RED ZONE       Need medical care now      Severe shortness of breath (feel you can't breathe)    Fever, chills    Not enough breath to do any activity    Trouble coughing up mucus, walking or talking    Blood in mucus    Frequent coughing   Rescue medicines  are not working    Not able to sleep because of breathing    Feel confused or drowsy    Chest pain    Actions:      Call your health care team.  If you cannot reach your care team, call 911 or go to the emergency room.        Annual Reminders:  Meet with Care Team, Flu Shot every Fall  Pharmacy:    [id number] - [facility] [address]  [facility] [place]  [address] [id number] - [facility] [address] [place] [address]  [facility] - USE FOR MAILING ONLY - [place], [place]  [facility] - [facility], [place] - [address] [facility] - [place], [place] - [address]

## 2018-08-01 NOTE — PROGRESS NOTES
Chief Complaint   Patient presents with     COPD     Irregular Heart Beat     CHIEF COMPLAINT:    The patient is a pleasant 78-year-old gentleman who's been previously diagnosed as having bladder cancer. He has been working with oncology in [place] and they are suggesting chemotherapy which he is refusing. He notes he would not be able to lift through the chemotherapy because of his severe COPD. He notes that recently his breathing has been slightly worse and he has been using his nebulizers aggressively. He is currently not using the oxygen and his saturations are 96% at rest but do drop precipitously with any activity. Oxygen is in the car. Notably, is having some problems with his atrial fibrillation. He has heart rates that range anywhere between the 40s and 120. He notes that he has been having some lightheadedness and occasional near syncope. He is not on any anticoagulation and I think this is probably best given his bladder cancer and vertical instability. I believe the risk of emboli and stroke is far less than the risk of complications from bleeding.                       PAST, FAMILY,SOCIAL HISTORY:     Medical  History:   has a past medical history of Abdominal aneurysm without mention of rupture (2007); Acute respiratory failure (H) (06/26/08); Anemia; BPH (benign prostatic hypertrophy); CAD (coronary artery disease) (9/13/2011); Chronic airway obstruction, not elsewhere classified; CKD (chronic kidney disease) stage 3, GFR 30-59 ml/min; Hyperlipidemia; Nasal septal perforation; Nonischemic cardiomyopathy (H); Obstructive chronic bronchitis with exacerbation (H) (07/04/08); Personal history of peptic ulcer disease; Pulmonary hypertension (echo 2/10); Thrombocytopenia (H); Thrombocytopenia (H) (6/24/2011); and Unspecified essential hypertension.     Surgical History:   has a past surgical history that includes CYSTOURETHROSCOPY; UGI ENDOSCOPY DIAG W OR W/O BRUSH/WASH (1991); VASECTOMY UNILAT/BILAT W  POSTOP SEMEN; aaa repair (10/01/2007); Laparoscopic appendectomy (6/24/2011); and Colonoscopy (10/12/2011).     Social History:   reports that he quit smoking about 10 years ago. He has a 50.00 pack-year smoking history. He has never used smokeless tobacco. He reports that he drinks alcohol. He reports that he does not use illicit drugs.     Family History:  family history includes Respiratory in his brother and father.            MEDICATIONS  Current Outpatient Prescriptions   Medication Sig Dispense Refill     ALPRAZolam (XANAX) 0.25 MG tablet Take 1 tablet (0.25 mg) by mouth 3 times daily as needed for anxiety 90 tablet 0     BROVANA 15 MCG/2ML NEBU neb solution Take 2 mLs (15 mcg) by nebulization 2 times daily 120 mL 11     budesonide (PULMICORT) 1 MG/2ML SUSP neb solution Take 2 mLs (1 mg) by nebulization 2 times daily 360 mL 3     COMBIVENT RESPIMAT  MCG/ACT inhaler USE 1 INHALATION FOUR TIMES A DAY, DO NOT EXCEED 6 INHALATIONS PER DAY 24 g 0     diltiazem (CARDIZEM) 120 MG tablet Take 1 tablet (120 mg) by mouth daily 1 tablet daily 90 tablet 3     lisinopril (PRINIVIL/ZESTRIL) 5 MG tablet Take 5 mg by mouth 2 times daily  180 tablet 3     MULTI-VITAMIN OR Reported on 4/26/2017       order for DME Equipment being ordered: Oxygen with portability and all necessary accessories 1 Device 0     Placebo (ORDER FOR DME) Equipment being ordered: Portable Nebulizer machine ([employer] Micro-Air Electronic Nebulizer System [id number]   1 each 0     terazosin (HYTRIN) 2 MG capsule Take 1 capsule (2 mg) by mouth At Bedtime 30 capsule 1     tiotropium (SPIRIVA HANDIHALER) 18 MCG capsule Inhale contents of one capsule daily. 90 capsule 3     atorvastatin (LIPITOR) 10 MG tablet Take 1 tablet (10 mg) by mouth daily (Patient not taking: Reported on 8/1/2018) 90 tablet 3     Coenzyme Q10 (COQ-10 PO) Reported on 4/26/2017       sildenafil (VIAGRA) 100 MG cap/tab Take 1 tablet (100 mg) by mouth daily as needed (Patient not taking:  Reported on 8/1/2018) 10 tablet 11         --------------------------------------------------------------------------------------------------------------------                          REVIEW OF SYSTEMS:       LUNGS: Pt denies: cough,excess sputum, hemoptysis, or shortness of breath at rest with oxygen in place. He does have dyspnea with any activity or exertion.   HEART: Pt denies: chest pain,  syncope, tachy or bradyarrhythmia or excess edema. Patient has been having occasional edema and does have the fast and slow heart rates consistent with sick sinus syndrome.   GI: Pt denies: nausea, vomitting, diarrhea, constipation, melena, or hematochezia.   NEURO: Pt denies: seizures, strokes, diplopia, weakness, paraesthesias, or paralysis.   SKIN: Pt denies: itching, rashes, discoloration, or specific lesions of concern. Denies recent hair loss.   PSYCH: The patient denies significant  mood imbalance. Specifically denies any suicidal ideation. Patient does have some anxiety and his concern about his impending death.                            EXAMINATION:         /64 (BP Location: Right arm, Patient Position: Chair, Cuff Size: Adult Regular)  Pulse 100  Temp 96.9  F (36.1  C) (Temporal)  Resp 16  Wt 121 lb 14.4 oz (55.3 kg)  SpO2 94%  BMI 19.68 kg/m2   Constitutional: The patient appears to be in no acute distress. The patient appears to be adequately hydrated. No acute respiratory or hemodynamic distress is noted at this time.   LUNGS: Breath sounds are nearly absent bilaterally, airflow is markedly delayed, no intercostal retraction or stridor is noted. No coughing is noted during visit.   HEART:  Irregularly irregular without rubs, clicks, gallops, or murmurs. PMI is nondisplaced. Upstrokes are brisk. S1,S2 are heard.   GI: Abdomen is soft, without rebound, guarding or tenderness. Bowel sounds are appropriate. No renal bruits are heard. 1+ edema is noted in the lower extremities bilaterally   NEURO: Pt is  alert and appropriate. No neurologic lateralization is noted. Cranial nerves 2-12 are intact. Peripheral sensory and motor function are grossly normal   SKIN:  warm and dry. No erythema, or rashes are noted. No specific lesions of concern are noted.                           DECISION MAKIN. SSS (sick sinus syndrome) (H)  Will set up Holter monitor.  Discussed below acuity and minimal trauma involved and possible pacemaker placement if necessary  - Holter Monitor 48 hour - Adult; Future    2. Near syncope  As above    3. Panlobular emphysema (H)  Continue aggressive nebulizer therapy  No need for steroid burst at this time  - COPD ACTION PLAN    4. Congestive heart failure, unspecified congestive heart failure chronicity, unspecified congestive heart failure type (H)  Modest, anticipate pulse control will resolve most of this    5. Hypertension goal BP (blood pressure) < 140/90  Controlled with current medication                             FOLLOW UP    I have asked the patient to make an appointment for follow up with me following the Holter        I have carefully explained the diagnosis and treatment options with the patient. The patient has displayed an understanding of the above, and all subsequent questions were answered.         DO ROEL [place]    Portions of this note were produced using [email or web address]  Although every attempt at real-time proof reading has been made, occasional grammar/syntax errors may have been missed.

## 2018-08-01 NOTE — MR AVS SNAPSHOT
After Visit Summary   8/1/2018    Ion Johnson    MRN: [id number]           Patient Information     Date Of Birth          1940        Visit Information        Provider Department      8/1/2018 7:20 AM Jimmie Kennedy DO [facility]        Today's Diagnoses     SSS (sick sinus syndrome) (H)    -  1    Near syncope        Congestive heart failure, unspecified congestive heart failure chronicity, unspecified congestive heart failure type (H)        Hypertension goal BP (blood pressure) < 140/90        Panlobular emphysema (H)           Follow-ups after your visit        Your next 10 appointments already scheduled     Aug 03, 2018  8:30 AM CDT   Holter Monitor with PH EVENT/HOLTER MONITOR   [facility] ([facility])    [address] [place] [id number] [phone]              Future tests that were ordered for you today     Open Future Orders        Priority Expected Expires Ordered    Holter Monitor 48 hour - Adult Routine  9/15/2018 8/1/2018            Who to contact     If you have questions or need follow up information about today's clinic visit or your schedule please contact [facility] directly at [phone].  Normal or non-critical lab and imaging results will be communicated to you by MyChart, letter or phone within 4 business days after the clinic has received the results. If you do not hear from us within 7 days, please contact the clinic through [employer] or phone. If you have a critical or abnormal lab result, we will notify you by phone as soon as possible.  Submit refill requests through [employer] or call your pharmacy and they will forward the refill request to us. Please allow 3 business days for your refill to be completed.          Additional Information About Your Visit        MyChart Information     [employer] gives you secure access to your electronic health record. If you see a primary  care provider, you can also send messages to your care team and make appointments. If you have questions, please call your primary care clinic.  If you do not have a primary care provider, please call [phone] and they will assist you.        Care EveryWhere ID     This is your Care EveryWhere ID. This could be used by other organizations to access your [facility] medical records  [id number]        Your Vitals Were     Pulse Temperature Respirations Pulse Oximetry BMI (Body Mass Index)       100 96.9  F (36.1  C) (Temporal) 16 94% 19.68 kg/m2        Blood Pressure from Last 3 Encounters:   08/01/18 112/64   07/28/18 137/58   05/30/18 (!) 158/102    Weight from Last 3 Encounters:   08/01/18 121 lb 14.4 oz (55.3 kg)   07/28/18 120 lb (54.4 kg)   05/30/18 131 lb 9.6 oz (59.7 kg)              We Performed the Following     COPD ACTION PLAN        Primary Care Provider Office Phone # Fax #    Jimmie Won Kennedy,  [phone]       [address] [facility] [address]        Equal Access to Services     SYLVAIN ROGER : Hadii aad taniya hadasho Soalexali, waaxda luqadaha, qaybta kaalmada adeegyada, nancy an . So [facility] [phone].    ATENCIÓN: Si habla español, tiene a john disposición servicios gratuitos de asistencia lingüística. Llame al [phone].    We comply with applicable federal civil rights laws and [place] laws. We do not discriminate on the basis of race, color, national origin, age, disability, sex, sexual orientation, or gender identity.            Thank you!     Thank you for choosing [facility]  for your care. Our goal is always to provide you with excellent care. Hearing back from our patients is one way we can continue to improve our services. Please take a few minutes to complete the written survey that you may receive in the mail after your visit with us. Thank you!             Your Updated Medication List - Protect others around you:  Learn how to safely use, store and throw away your medicines at www.disposemymeds.org.          This list is accurate as of 8/1/18  8:04 AM.  Always use your most recent med list.                   Brand Name Dispense Instructions for use Diagnosis    ALPRAZolam 0.25 MG tablet    XANAX    90 tablet    Take 1 tablet (0.25 mg) by mouth 3 times daily as needed for anxiety    Anxiety       atorvastatin 10 MG tablet    LIPITOR    90 tablet    Take 1 tablet (10 mg) by mouth daily    Hyperlipidemia LDL goal <100, Hypertension goal BP (blood pressure) < 140/90, Chronic obstructive pulmonary disease, unspecified COPD type (H), Hypertrophy of prostate without urinary obstruction, Congestive heart failure, unspecified congestive heart failure chronicity, unspecified congestive heart failure type (H), CKD (chronic kidney disease) stage 3, GFR 30-59 ml/min       BROVANA 15 MCG/2ML Nebu neb solution   Generic drug:  arformoterol     120 mL    Take 2 mLs (15 mcg) by nebulization 2 times daily    Chronic obstructive pulmonary disease, unspecified COPD type (H)       budesonide 1 MG/2ML Susp neb solution    PULMICORT    360 mL    Take 2 mLs (1 mg) by nebulization 2 times daily    Emphysematous bleb (H)       COMBIVENT RESPIMAT  MCG/ACT inhaler   Generic drug:  Ipratropium-Albuterol     24 g    USE 1 INHALATION FOUR TIMES A DAY, DO NOT EXCEED 6 INHALATIONS PER DAY    Hyperlipidemia LDL goal <100, Hypertension goal BP (blood pressure) < 140/90, Chronic obstructive pulmonary disease, unspecified COPD type (H), Hypertrophy of prostate without urinary obstruction, Congestive heart failure, unspecified congestive heart failure chronicity, unspecified congestive heart failure type (H), CKD (chronic kidney disease) stage 3, GFR 30-59 ml/min       COQ-10 PO      Reported on 4/26/2017        diltiazem 120 MG tablet    CARDIZEM    90 tablet    Take 1 tablet (120 mg) by mouth daily 1 tablet daily    Congestive heart failure, unspecified  congestive heart failure chronicity, unspecified congestive heart failure type (H), Hypertension goal BP (blood pressure) < 140/90       lisinopril 5 MG tablet    PRINIVIL/ZESTRIL    180 tablet    Take 5 mg by mouth 2 times daily    Hypertension goal BP (blood pressure) < 140/90       MULTI-VITAMIN PO      Reported on 4/26/2017        order for DME     1 each    Equipment being ordered: Portable Nebulizer machine ([employer] Micro-Air Electronic Nebulizer System [id number]    Chronic airway obstruction, not elsewhere classified       order for DME     1 Device    Equipment being ordered: Oxygen with portability and all necessary accessories    Congestive heart failure, unspecified congestive heart failure chronicity, unspecified congestive heart failure type (H), Chronic obstructive pulmonary disease, unspecified COPD type (H)       sildenafil 100 MG tablet    VIAGRA    10 tablet    Take 1 tablet (100 mg) by mouth daily as needed    Hyperlipidemia LDL goal <100, Hypertension goal BP (blood pressure) < 140/90, Chronic obstructive pulmonary disease, unspecified COPD type (H), Hypertrophy of prostate without urinary obstruction, Congestive heart failure, unspecified congestive heart failure chronicity, unspecified congestive heart failure type (H), CKD (chronic kidney disease) stage 3, GFR 30-59 ml/min       terazosin 2 MG capsule    HYTRIN    30 capsule    Take 1 capsule (2 mg) by mouth At Bedtime    Hypertrophy of prostate without urinary obstruction       tiotropium 18 MCG capsule    SPIRIVA HANDIHALER    90 capsule    Inhale contents of one capsule daily.    Chronic obstructive pulmonary disease, unspecified COPD type (H)

## 2018-08-09 NOTE — TELEPHONE ENCOUNTER
Called patient to let him know that his results are not back yet and we will call him when we receive them. Patient asked if he could have a CT Scan if he has a pacemaker and I told him that would be fine, [facility]    Danyell Paula XRO/  [facility]

## 2018-08-09 NOTE — TELEPHONE ENCOUNTER
Reason for Call:  Request for results:    Name of test or procedure: holter monitor     Date of test of procedure: August 3rd, 2018 - Monday August 6th, 2018    Location of the test or procedure: [facility] to leave the result message on voice mail or with a family member? yes    Phone number Patient can be reached at:  Home number on file [phone] (home)    Additional comments: patient brought the monitor back on Monday and would like to get the results, please call and advise     Call taken on 8/9/2018 at 1:21 PM by Lise Collins

## 2018-08-15 NOTE — TELEPHONE ENCOUNTER
Wife is calling asking if they can get a call back with results. Still have not heard anything.  Thank you,  Ursula Gill   for [facility]

## 2018-08-15 NOTE — PROGRESS NOTES
Please contact the patient and notify him of the following:  Holter monitor demonstrates frequent ventricular ectopy with 121 run beat of slow ventricular tachycardia.  Given the history of bradycardia and now the presence of the ventricular ectopy, I would like to set the patient up to see a cardiologist.  Please help him schedule this if you can.  Thank you.   DO ROEL Boone

## 2018-08-16 NOTE — TELEPHONE ENCOUNTER
I have attempted to contact this patient by phone with the following results: left message to return my call on answering machine.  Michelle Clancy MA     8/16/2018

## 2018-08-16 NOTE — TELEPHONE ENCOUNTER
----- Message from Jimmie Kennedy DO sent at 8/15/2018  4:17 PM CDT -----  Please contact the patient and notify him of the following:  Holter monitor demonstrates frequent ventricular ectopy with 121 run beat of slow ventricular tachycardia.  Given the history of bradycardia and now the presence of the ventricular ectopy, I would like to set the patient up to see a cardiologist.  Please help him schedule this if you can.  Thank you.   Jimmie Kennedy DO FACOI

## 2018-08-16 NOTE — TELEPHONE ENCOUNTER
I notified patient. [facility] stated that [facility]. He plans to call us powers if we need to send a referral.  Michelle Clancy MA     8/16/2018

## 2018-08-17 NOTE — TELEPHONE ENCOUNTER
"Patient would like to see a cardiologist at [facility] instead of [facility], stated that is it \"easier to coordinate.\"    Will call and make appointment next week.    Danyell NOEL/  [facility]  "

## 2018-08-27 NOTE — LETTER
8/27/2018    Jimmie Kennedy, DO  [address] [facility] [address]    RE: Ion Johnson       Dear Colleague,    I had the pleasure of seeing Ion Johnson in the [facility].    HISTORY:    Ion Johnson is a 78-year-old gentleman accompanied by his wife today.  He has a history of nonischemic cardiomyopathy with ejection fraction of 25-30% which recovered nicely with normal ejection fraction on the last echocardiogram done in 2011.  He also has a history of end-stage COPD and uses oxygen most of the time.  He finds his best time to be mornings when he tries to go without his oxygen but usually by noon he has to get on it because of breathlessness.  In addition he was recently diagnosed with bladder cancer for which she has refused chemotherapy and he has a history of AAA endo repair with a documented endoleak.  Additionally, he has a history of hypertension and hyperlipidemia.    Ion was asked to see me today to review his Holter monitor.  The Holter monitor was ordered because the patient wears a pulse monitor much of the day and noticed a wide variability in his pulse, with measurements frequently as low as 40 and as high as 100.  When his pulse was low he noticed dizziness which seem to be mostly orthostatic.  He has not had any syncopal or near syncopal episodes.  The Holter monitor demonstrated normal heart rate variability with the slowest heart rate occurring at 6:30 in the morning with a documented value of 53 bpm.  There were no pauses.  There were frequent PVCs, amounting to 11% of the total.    Ion denies any exertional chest, neck, arm, or shoulder discomfort but he also acknowledges that he is extremely inactive.  He describes weakness of his legs which she attributes to lack of exercise.  For example he complained of weak legs when he tried to climb up on the examining table for me today, a walk of about 2 or 3 feet.  On exam his peripheral pulses  are normal.  He has no significant edema.  He denies PND/orthopnea or any syncope/near syncope.  He also denies strokelike symptoms.    Unfortunately Ion is absolutely convinced that his previous heart problem came about because he had a chemical nuclear stress test done.  When I explained to him that a stress test cannot cause the weak heart but rather detects it, he denies he ever had a weak heart at all.  He was quite adamant that he preferred not to have any cardiology evaluation done.    ASSESSMENT/PLAN:    1.  Frequent PVCs.  11% PVCs seen on Holter monitor which may be caused by cardiomyopathy or may in fact cause cardiomyopathy.  His PMI is displaced but I do not hear a definite S3 or S4 on exam, and he does not show signs of volume overload on exam today.  A recent proBNP was negative.  He may very well have a recurrence of his cardiomyopathy.  2.  Previous cardiomyopathy, questionable recurrence.  As discussed above, no signs of volume overload at this time but his PMI is displaced and he complains of marked dyspnea on exertion which of course may be secondary to his severe COPD.  I would like to get an echocardiogram to reevaluate his ejection fraction but he is not interested in having this done.  3.  Possible CAD.  This patient has a history of AAA repair and is at very high risk of CAD.  Unfortunately, he is convinced that his rest test caused his cardiomyopathy many years ago and adamant that he will not undergo stress testing.  4.  Possible symptomatic bradycardia.  The patient uses an oximeter that he wears much of the day and notices wide variability in his heart rate, which brought him to medical attention in the first place.  He had a 48 hour Holter monitor done which did not show any significant bradycardia, but he does not recall whether he had his symptoms during this 48 hour timeframe.  It is possible that he is pulse oximetry miss read his pulse because of low oxygen saturation, or it is  possible that we simply missed his bradycardia on the 48 hour Holter monitor.  I offered to use a ZioPatch, which might be easier for him because he complained of getting tangled up in the wires of the Holter monitor.  At first he refused and said he was not interested in having anything done but at the conclusion of our conversation he decided to move forward with this.    To summarize, ZioPatch will be ordered but he is not willing or interested in pursuing other evaluation for cardiomyopathy or coronary artery disease (echo or stress testing).  I will call him with the results of the ZioPatch and have him come in and talk to me if it shows anything significant.  Thank you for asked me to participate in your patient's care and please do not hesitate to call if I can be of further assistance    Orders Placed This Encounter   Procedures     Zio Patch Monitor     No orders of the defined types were placed in this encounter.    There are no discontinued medications.    10 year ASCVD risk: The ASCVD Risk score (Arash DC Jr, et al., 2013) failed to calculate for the following reasons:    The 2013 ASCVD risk score is only valid for ages 40 to 79    Encounter Diagnoses   Name Primary?     Bradycardia Yes     PVC's (premature ventricular contractions)        CURRENT MEDICATIONS:  Current Outpatient Prescriptions   Medication Sig Dispense Refill     ALPRAZolam (XANAX) 0.25 MG tablet Take 1 tablet (0.25 mg) by mouth 3 times daily as needed for anxiety 90 tablet 0     BETA BLOCKER NOT PRESCRIBED, INTENTIONAL, Beta Blocker not prescribed intentionally due to Bradycardia < 50 bpm without beta blocker therapy  0     BROVANA 15 MCG/2ML NEBU neb solution Take 2 mLs (15 mcg) by nebulization 2 times daily 120 mL 11     budesonide (PULMICORT) 1 MG/2ML SUSP neb solution Take 2 mLs (1 mg) by nebulization 2 times daily 360 mL 3     Coenzyme Q10 (COQ-10 PO) Reported on 4/26/2017       COMBIVENT RESPIMAT  MCG/ACT inhaler USE 1  INHALATION FOUR TIMES A DAY, DO NOT EXCEED 6 INHALATIONS PER DAY 24 g 0     diltiazem (CARDIZEM) 120 MG tablet Take 1 tablet (120 mg) by mouth daily 1 tablet daily 90 tablet 3     lisinopril (PRINIVIL/ZESTRIL) 5 MG tablet Take 5 mg by mouth 2 times daily  180 tablet 3     MULTI-VITAMIN OR Reported on 4/26/2017       terazosin (HYTRIN) 2 MG capsule Take 1 capsule (2 mg) by mouth At Bedtime 30 capsule 1     tiotropium (SPIRIVA HANDIHALER) 18 MCG capsule Inhale contents of one capsule daily. 90 capsule 3     order for DME Equipment being ordered: Oxygen with portability and all necessary accessories 1 Device 0     Placebo (ORDER FOR DME) Equipment being ordered: Portable Nebulizer machine ([employer] Micro-Air Electronic Nebulizer System [id number]   1 each 0       ALLERGIES     Allergies   Allergen Reactions     Ativan      Benzyl Alcohol      Bupropion Hcl Hives and Swelling     Wellbutrin.  THROAT SWELLING.     Haldol [Butyrophenones]      Possible allergy     Levaquin [Quinolones] Swelling     ANGIOEDEMA, THROAT SWELLING RESULTING IN INTUBATION.     Medrol [Methylprednisolone]      Possible allergy       PAST MEDICAL HISTORY:  Past Medical History:   Diagnosis Date     Abdominal aneurysm without mention of rupture 2007    Infrarenal AAA repair     Acute respiratory failure (H) 06/26/08    Transfer 7/4 to [facility] for further care of severe COPD.     Anemia      BPH (benign prostatic hypertrophy)      CAD (coronary artery disease) 9/13/2011     Chronic airway obstruction, not elsewhere classified     COPD     CKD (chronic kidney disease) stage 3, GFR 30-59 ml/min      Hyperlipidemia      Nasal septal perforation      Nonischemic cardiomyopathy (H)     EF subsequently normalized with med management     Obstructive chronic bronchitis with exacerbation (H) 07/04/08     Personal history of peptic ulcer disease      Pulmonary hypertension echo 2/10    mild-moderate     Thrombocytopenia (H)      Thrombocytopenia (H)  6/24/2011     Unspecified essential hypertension        PAST SURGICAL HISTORY:  Past Surgical History:   Procedure Laterality Date     AAA REPAIR  10/01/2007     COLONOSCOPY  10/12/2011    Procedure:COMBINED COLONOSCOPY, SINGLE BIOPSY/POLYPECTOMY BY BIOPSY; Colonoscopy, Polypectomy by Biopsy; Surgeon:MARK ALVAREZ; Location: GI      CYSTOURETHROSCOPY        UGI ENDOSCOPY DIAG W OR W/O BRUSH/WASH  1991     HC VASECTOMY UNILAT/BILAT W POSTOP SEMEN      Vasectomy     LAPAROSCOPIC APPENDECTOMY  6/24/2011    Procedure:LAPAROSCOPIC APPENDECTOMY; Surgeon:WOODROW MORENO; Location: OR       FAMILY HISTORY:  Family History   Problem Relation Age of Onset     Respiratory Father      emphysema     Respiratory Brother      emphysema       SOCIAL HISTORY:  Social History     Social History     Marital status:      Spouse name: Mercedes     Number of children: 3     Years of education: 12     Occupational History     retired 0retired      0retired     Social History Main Topics     Smoking status: Former Smoker     Packs/day: 1.00     Years: 50.00     Quit date: 8/24/2007     Smokeless tobacco: Never Used      Comment: Quit      Alcohol use 0.0 oz/week     0 Standard drinks or equivalent per week      Comment: occasionally     Drug use: No     Sexual activity: Yes     Partners: Female     Other Topics Concern      Service Yes     [employer]     Blood Transfusions No     Caffeine Concern No     Occupational Exposure No     Hobby Hazards No     Sleep Concern Yes     hard time falling asleep.     Stress Concern No     Weight Concern No     Special Diet No     Back Care No     Exercise No     Bike Helmet No     Seat Belt Yes     Self-Exams Yes     Parent/Sibling W/ Cabg, Mi Or Angioplasty Before 65f 55m? No     Social History Narrative       Review of Systems:  Skin:  Negative     Eyes:  Positive for glasses  ENT:  Negative    Respiratory:  Positive for dyspnea on exertion;shortness of breath  Cardiovascular:   "Negative for;chest pain;edema Positive for;palpitations;lightheadedness  Gastroenterology: Negative    Genitourinary:  Positive for    Musculoskeletal:  Positive for nocturnal cramping  Neurologic:  Negative    Psychiatric:  Positive for anxiety;sleep disturbances  Heme/Lymph/Imm:  Positive for allergies  Endocrine:  Negative      Physical Exam:  Vitals: /52 (BP Location: Left arm, Patient Position: Fowlers, Cuff Size: Adult Regular)  Pulse 78  Ht 1.676 m (5' 6\")  Wt 54.8 kg (120 lb 12.8 oz)  SpO2 94%  BMI 19.5 kg/m2    Constitutional:  cooperative thin      Skin:  warm and dry to the touch        Head:  normocephalic        Eyes:  no xanthalasma        ENT:  no pallor or cyanosis        Neck:  carotid pulses are full and equal bilaterally, JVP normal, no carotid bruit        Chest:      Diminished air exchange, no crackles or wheezes    Cardiac: regular rhythm;normal S1 and S2;no S3 or S4 occasional premature beats           Apical impulse displaced medially, best felt subcostal region    Abdomen:  abdomen soft;BS normoactive        Vascular: pulses full and equal                                      Extremities and Back:  no edema        Neurological:  no gross motor deficits          Recent Lab Results:  LIPID RESULTS:  Lab Results   Component Value Date    CHOL 141 04/26/2017    HDL 57 04/26/2017    LDL 72 04/26/2017    TRIG 58 04/26/2017    CHOLHDLRATIO 2.7 07/02/2015       LIVER ENZYME RESULTS:  Lab Results   Component Value Date    AST 26 03/14/2018    ALT 21 03/14/2018       CBC RESULTS:  Lab Results   Component Value Date    WBC 6.5 03/14/2018    RBC 5.03 03/14/2018    HGB 14.7 03/14/2018    HCT 46.1 03/14/2018    MCV 92 03/14/2018    MCH 29.2 03/14/2018    MCHC 31.9 03/14/2018    RDW 13.7 03/14/2018     03/14/2018       BMP RESULTS:  Lab Results   Component Value Date     05/30/2018    POTASSIUM 4.4 05/30/2018    CHLORIDE 108 05/30/2018    CO2 27 05/30/2018    ANIONGAP 8 05/30/2018 "    GLC 97 05/30/2018    BUN 24 05/30/2018    CR 1.32 (H) 05/30/2018    GFRESTIMATED 52 (L) 05/30/2018    GFRESTBLACK 63 05/30/2018    MAX 9.1 05/30/2018        A1C RESULTS:  Lab Results   Component Value Date    A1C 6.3 (H) 06/26/2008       INR RESULTS:  Lab Results   Component Value Date    INR 1.13 07/13/2008    INR 1.17 (H) 07/12/2008       Thank you for allowing me to participate in the care of your patient.    Sincerely,     Won Thao MD     [facility]

## 2018-08-27 NOTE — MR AVS SNAPSHOT
After Visit Summary   8/27/2018    Ion Johnson    MRN: [id number]           Patient Information     Date Of Birth          1940        Visit Information        Provider Department      8/27/2018 10:00 AM Won Thao MD [facility]        Today's Diagnoses     Bradycardia    -  1    PVC's (premature ventricular contractions)           Follow-ups after your visit        Future tests that were ordered for you today     Open Future Orders        Priority Expected Expires Ordered    Zio Patch Monitor Routine 9/3/2018 8/27/2019 8/27/2018            Who to contact     If you have questions or need follow up information about today's clinic visit or your schedule please contact [facility] directly at [phone].  Normal or non-critical lab and imaging results will be communicated to you by DistalMotionhart, letter or phone within 4 business days after the clinic has received the results. If you do not hear from us within 7 days, please contact the clinic through DistalMotionhart or phone. If you have a critical or abnormal lab result, we will notify you by phone as soon as possible.  Submit refill requests through Accessbio or call your pharmacy and they will forward the refill request to us. Please allow 3 business days for your refill to be completed.          Additional Information About Your Visit        MyChart Information     Accessbio gives you secure access to your electronic health record. If you see a primary care provider, you can also send messages to your care team and make appointments. If you have questions, please call your primary care clinic.  If you do not have a primary care provider, please call [phone] and they will assist you.        Care EveryWhere ID     This is your Care EveryWhere ID. This could be used by other organizations to access your [place] medical records  [id number]        Your  "Vitals Were     Pulse Height Pulse Oximetry BMI (Body Mass Index)          78 1.676 m (5' 6\") 94% 19.5 kg/m2         Blood Pressure from Last 3 Encounters:   08/27/18 142/52   08/01/18 112/64   07/28/18 137/58    Weight from Last 3 Encounters:   08/27/18 54.8 kg (120 lb 12.8 oz)   08/01/18 55.3 kg (121 lb 14.4 oz)   07/28/18 54.4 kg (120 lb)               Primary Care Provider Office Phone # Fax #    Jimmie Kennedy [phone]       [address] [facility] [address]        Equal Access to Services     KYLAH ROGER : Frandy wilhelmo Sozaid, waaxda luqadaha, qaybta kaalmada adeegyada, nancy sanchez. So [facility] [phone].    ATENCIÓN: Si habla español, tiene a john disposición servicios gratuitos de asistencia lingüística. Ll[facility] [phone].    We comply with applicable federal civil rights laws and [place] laws. We do not discriminate on the basis of race, color, national origin, age, disability, sex, sexual orientation, or gender identity.            Thank you!     Thank you for choosing [facility]  for your care. Our goal is always to provide you with excellent care. Hearing back from our patients is one way we can continue to improve our services. Please take a few minutes to complete the written survey that you may receive in the mail after your visit with us. Thank you!             Your Updated Medication List - Protect others around you: Learn how to safely use, store and throw away your medicines at www.disposemymeds.org.          This list is accurate as of 8/27/18 10:45 AM.  Always use your most recent med list.                   Brand Name Dispense Instructions for use Diagnosis    ALPRAZolam 0.25 MG tablet    XANAX    90 tablet    Take 1 tablet (0.25 mg) by mouth 3 times daily as needed for anxiety    Anxiety       BETA BLOCKER NOT PRESCRIBED (INTENTIONAL)      Beta Blocker not prescribed " intentionally due to Bradycardia < 50 bpm without beta blocker therapy    SSS (sick sinus syndrome) (H)       BROVANA 15 MCG/2ML Nebu neb solution   Generic drug:  arformoterol     120 mL    Take 2 mLs (15 mcg) by nebulization 2 times daily    Chronic obstructive pulmonary disease, unspecified COPD type (H)       budesonide 1 MG/2ML Susp neb solution    PULMICORT    360 mL    Take 2 mLs (1 mg) by nebulization 2 times daily    Emphysematous bleb (H)       COMBIVENT RESPIMAT  MCG/ACT inhaler   Generic drug:  Ipratropium-Albuterol     24 g    USE 1 INHALATION FOUR TIMES A DAY, DO NOT EXCEED 6 INHALATIONS PER DAY    Hyperlipidemia LDL goal <100, Hypertension goal BP (blood pressure) < 140/90, Chronic obstructive pulmonary disease, unspecified COPD type (H), Hypertrophy of prostate without urinary obstruction, Congestive heart failure, unspecified congestive heart failure chronicity, unspecified congestive heart failure type (H), CKD (chronic kidney disease) stage 3, GFR 30-59 ml/min       COQ-10 PO      Reported on 4/26/2017        diltiazem 120 MG tablet    CARDIZEM    90 tablet    Take 1 tablet (120 mg) by mouth daily 1 tablet daily    Congestive heart failure, unspecified congestive heart failure chronicity, unspecified congestive heart failure type (H), Hypertension goal BP (blood pressure) < 140/90       lisinopril 5 MG tablet    PRINIVIL/ZESTRIL    180 tablet    Take 5 mg by mouth 2 times daily    Hypertension goal BP (blood pressure) < 140/90       MULTI-VITAMIN PO      Reported on 4/26/2017        order for DME     1 each    Equipment being ordered: Portable Nebulizer machine ([employer] Micro-Air Electronic Nebulizer System [id number]    Chronic airway obstruction, not elsewhere classified       order for DME     1 Device    Equipment being ordered: Oxygen with portability and all necessary accessories    Congestive heart failure, unspecified congestive heart failure chronicity, unspecified congestive heart  failure type (H), Chronic obstructive pulmonary disease, unspecified COPD type (H)       terazosin 2 MG capsule    HYTRIN    30 capsule    Take 1 capsule (2 mg) by mouth At Bedtime    Hypertrophy of prostate without urinary obstruction       tiotropium 18 MCG capsule    SPIRIVA HANDIHALER    90 capsule    Inhale contents of one capsule daily.    Chronic obstructive pulmonary disease, unspecified COPD type (H)

## 2018-08-27 NOTE — PROGRESS NOTES
HISTORY:    Ion Johnson is a 78-year-old gentleman accompanied by his wife today.  He has a history of nonischemic cardiomyopathy with ejection fraction of 25-30% which recovered nicely with normal ejection fraction on the last echocardiogram done in 2011.  He also has a history of end-stage COPD and uses oxygen most of the time.  He finds his best time to be mornings when he tries to go without his oxygen but usually by noon he has to get on it because of breathlessness.  In addition he was recently diagnosed with bladder cancer for which she has refused chemotherapy and he has a history of AAA endo repair with a documented endoleak.  Additionally, he has a history of hypertension and hyperlipidemia.    Ion was asked to see me today to review his Holter monitor.  The Holter monitor was ordered because the patient wears a pulse monitor much of the day and noticed a wide variability in his pulse, with measurements frequently as low as 40 and as high as 100.  When his pulse was low he noticed dizziness which seem to be mostly orthostatic.  He has not had any syncopal or near syncopal episodes.  The Holter monitor demonstrated normal heart rate variability with the slowest heart rate occurring at 6:30 in the morning with a documented value of 53 bpm.  There were no pauses.  There were frequent PVCs, amounting to 11% of the total.    Ion denies any exertional chest, neck, arm, or shoulder discomfort but he also acknowledges that he is extremely inactive.  He describes weakness of his legs which she attributes to lack of exercise.  For example he complained of weak legs when he tried to climb up on the examining table for me today, a walk of about 2 or 3 feet.  On exam his peripheral pulses are normal.  He has no significant edema.  He denies PND/orthopnea or any syncope/near syncope.  He also denies strokelike symptoms.    Unfortunately Ion is absolutely convinced that his previous heart problem came about  because he had a chemical nuclear stress test done.  When I explained to him that a stress test cannot cause the weak heart but rather detects it, he denies he ever had a weak heart at all.  He was quite adamant that he preferred not to have any cardiology evaluation done.    ASSESSMENT/PLAN:    1.  Frequent PVCs.  11% PVCs seen on Holter monitor which may be caused by cardiomyopathy or may in fact cause cardiomyopathy.  His PMI is displaced but I do not hear a definite S3 or S4 on exam, and he does not show signs of volume overload on exam today.  A recent proBNP was negative.  He may very well have a recurrence of his cardiomyopathy.  2.  Previous cardiomyopathy, questionable recurrence.  As discussed above, no signs of volume overload at this time but his PMI is displaced and he complains of marked dyspnea on exertion which of course may be secondary to his severe COPD.  I would like to get an echocardiogram to reevaluate his ejection fraction but he is not interested in having this done.  3.  Possible CAD.  This patient has a history of AAA repair and is at very high risk of CAD.  Unfortunately, he is convinced that his rest test caused his cardiomyopathy many years ago and adamant that he will not undergo stress testing.  4.  Possible symptomatic bradycardia.  The patient uses an oximeter that he wears much of the day and notices wide variability in his heart rate, which brought him to medical attention in the first place.  He had a 48 hour Holter monitor done which did not show any significant bradycardia, but he does not recall whether he had his symptoms during this 48 hour timeframe.  It is possible that he is pulse oximetry miss read his pulse because of low oxygen saturation, or it is possible that we simply missed his bradycardia on the 48 hour Holter monitor.  I offered to use a ZioPatch, which might be easier for him because he complained of getting tangled up in the wires of the Holter monitor.  At  first he refused and said he was not interested in having anything done but at the conclusion of our conversation he decided to move forward with this.    To summarize, ZioPatch will be ordered but he is not willing or interested in pursuing other evaluation for cardiomyopathy or coronary artery disease (echo or stress testing).  I will call him with the results of the ZioPatch and have him come in and talk to me if it shows anything significant.  Thank you for asked me to participate in your patient's care and please do not hesitate to call if I can be of further assistance    Orders Placed This Encounter   Procedures     Zio Patch Monitor     No orders of the defined types were placed in this encounter.    There are no discontinued medications.    10 year ASCVD risk: The ASCVD Risk score (Arash DC Jr, et al., 2013) failed to calculate for the following reasons:    The 2013 ASCVD risk score is only valid for ages 40 to 79    Encounter Diagnoses   Name Primary?     Bradycardia Yes     PVC's (premature ventricular contractions)        CURRENT MEDICATIONS:  Current Outpatient Prescriptions   Medication Sig Dispense Refill     ALPRAZolam (XANAX) 0.25 MG tablet Take 1 tablet (0.25 mg) by mouth 3 times daily as needed for anxiety 90 tablet 0     BETA BLOCKER NOT PRESCRIBED, INTENTIONAL, Beta Blocker not prescribed intentionally due to Bradycardia < 50 bpm without beta blocker therapy  0     BROVANA 15 MCG/2ML NEBU neb solution Take 2 mLs (15 mcg) by nebulization 2 times daily 120 mL 11     budesonide (PULMICORT) 1 MG/2ML SUSP neb solution Take 2 mLs (1 mg) by nebulization 2 times daily 360 mL 3     Coenzyme Q10 (COQ-10 PO) Reported on 4/26/2017       COMBIVENT RESPIMAT  MCG/ACT inhaler USE 1 INHALATION FOUR TIMES A DAY, DO NOT EXCEED 6 INHALATIONS PER DAY 24 g 0     diltiazem (CARDIZEM) 120 MG tablet Take 1 tablet (120 mg) by mouth daily 1 tablet daily 90 tablet 3     lisinopril (PRINIVIL/ZESTRIL) 5 MG tablet Take 5  mg by mouth 2 times daily  180 tablet 3     MULTI-VITAMIN OR Reported on 4/26/2017       terazosin (HYTRIN) 2 MG capsule Take 1 capsule (2 mg) by mouth At Bedtime 30 capsule 1     tiotropium (SPIRIVA HANDIHALER) 18 MCG capsule Inhale contents of one capsule daily. 90 capsule 3     order for DME Equipment being ordered: Oxygen with portability and all necessary accessories 1 Device 0     Placebo (ORDER FOR DME) Equipment being ordered: Portable Nebulizer machine ([employer] Micro-Air Electronic Nebulizer System [id number]   1 each 0       ALLERGIES     Allergies   Allergen Reactions     Ativan      Benzyl Alcohol      Bupropion Hcl Hives and Swelling     Wellbutrin.  THROAT SWELLING.     Haldol [Butyrophenones]      Possible allergy     Levaquin [Quinolones] Swelling     ANGIOEDEMA, THROAT SWELLING RESULTING IN INTUBATION.     Medrol [Methylprednisolone]      Possible allergy       PAST MEDICAL HISTORY:  Past Medical History:   Diagnosis Date     Abdominal aneurysm without mention of rupture 2007    Infrarenal AAA repair     Acute respiratory failure (H) 06/26/08    Transfer 7/4 to [facility] for further care of severe COPD.     Anemia      BPH (benign prostatic hypertrophy)      CAD (coronary artery disease) 9/13/2011     Chronic airway obstruction, not elsewhere classified     COPD     CKD (chronic kidney disease) stage 3, GFR 30-59 ml/min      Hyperlipidemia      Nasal septal perforation      Nonischemic cardiomyopathy (H)     EF subsequently normalized with med management     Obstructive chronic bronchitis with exacerbation (H) 07/04/08     Personal history of peptic ulcer disease      Pulmonary hypertension echo 2/10    mild-moderate     Thrombocytopenia (H)      Thrombocytopenia (H) 6/24/2011     Unspecified essential hypertension        PAST SURGICAL HISTORY:  Past Surgical History:   Procedure Laterality Date     AAA REPAIR  10/01/2007     COLONOSCOPY  10/12/2011    Procedure:COMBINED COLONOSCOPY, SINGLE  BIOPSY/POLYPECTOMY BY BIOPSY; Colonoscopy, Polypectomy by Biopsy; Surgeon:MARK ALVAREZ; Location:[facility]     HC CYSTOURETHROSCOPY       HC UGI ENDOSCOPY DIAG W OR W/O BRUSH/WASH  1991     HC VASECTOMY UNILAT/BILAT W POSTOP SEMEN      Vasectomy     LAPAROSCOPIC APPENDECTOMY  6/24/2011    Procedure:LAPAROSCOPIC APPENDECTOMY; Surgeon:WOODROW MORENO; Location:[facility]       FAMILY HISTORY:  Family History   Problem Relation Age of Onset     Respiratory Father      emphysema     Respiratory Brother      emphysema       SOCIAL HISTORY:  Social History     Social History     Marital status:      Spouse name: Mercedes     Number of children: 3     Years of education: 12     Occupational History     retired 0retired      0retired     Social History Main Topics     Smoking status: Former Smoker     Packs/day: 1.00     Years: 50.00     Quit date: 8/24/2007     Smokeless tobacco: Never Used      Comment: Quit      Alcohol use 0.0 oz/week     0 Standard drinks or equivalent per week      Comment: occasionally     Drug use: No     Sexual activity: Yes     Partners: Female     Other Topics Concern      Service Yes     [employer]     Blood Transfusions No     Caffeine Concern No     Occupational Exposure No     Hobby Hazards No     Sleep Concern Yes     hard time falling asleep.     Stress Concern No     Weight Concern No     Special Diet No     Back Care No     Exercise No     Bike Helmet No     Seat Belt Yes     Self-Exams Yes     Parent/Sibling W/ Cabg, Mi Or Angioplasty Before 65f 55m? No     Social History Narrative       Review of Systems:  Skin:  Negative     Eyes:  Positive for glasses  ENT:  Negative    Respiratory:  Positive for dyspnea on exertion;shortness of breath  Cardiovascular:  Negative for;chest pain;edema Positive for;palpitations;lightheadedness  Gastroenterology: Negative    Genitourinary:  Positive for    Musculoskeletal:  Positive for nocturnal cramping  Neurologic:  Negative    Psychiatric:   "Positive for anxiety;sleep disturbances  Heme/Lymph/Imm:  Positive for allergies  Endocrine:  Negative      Physical Exam:  Vitals: /52 (BP Location: Left arm, Patient Position: Fowlers, Cuff Size: Adult Regular)  Pulse 78  Ht 1.676 m (5' 6\")  Wt 54.8 kg (120 lb 12.8 oz)  SpO2 94%  BMI 19.5 kg/m2    Constitutional:  cooperative thin      Skin:  warm and dry to the touch        Head:  normocephalic        Eyes:  no xanthalasma        ENT:  no pallor or cyanosis        Neck:  carotid pulses are full and equal bilaterally, JVP normal, no carotid bruit        Chest:      Diminished air exchange, no crackles or wheezes    Cardiac: regular rhythm;normal S1 and S2;no S3 or S4 occasional premature beats           Apical impulse displaced medially, best felt subcostal region    Abdomen:  abdomen soft;BS normoactive        Vascular: pulses full and equal                                      Extremities and Back:  no edema        Neurological:  no gross motor deficits          Recent Lab Results:  LIPID RESULTS:  Lab Results   Component Value Date    CHOL 141 04/26/2017    HDL 57 04/26/2017    LDL 72 04/26/2017    TRIG 58 04/26/2017    CHOLHDLRATIO 2.7 07/02/2015       LIVER ENZYME RESULTS:  Lab Results   Component Value Date    AST 26 03/14/2018    ALT 21 03/14/2018       CBC RESULTS:  Lab Results   Component Value Date    WBC 6.5 03/14/2018    RBC 5.03 03/14/2018    HGB 14.7 03/14/2018    HCT 46.1 03/14/2018    MCV 92 03/14/2018    MCH 29.2 03/14/2018    MCHC 31.9 03/14/2018    RDW 13.7 03/14/2018     03/14/2018       BMP RESULTS:  Lab Results   Component Value Date     05/30/2018    POTASSIUM 4.4 05/30/2018    CHLORIDE 108 05/30/2018    CO2 27 05/30/2018    ANIONGAP 8 05/30/2018    GLC 97 05/30/2018    BUN 24 05/30/2018    CR 1.32 (H) 05/30/2018    GFRESTIMATED 52 (L) 05/30/2018    GFRESTBLACK 63 05/30/2018    MAX 9.1 05/30/2018        A1C RESULTS:  Lab Results   Component Value Date    A1C 6.3 (H) " 06/26/2008       INR RESULTS:  Lab Results   Component Value Date    INR 1.13 07/13/2008    INR 1.17 (H) 07/12/2008         Won Thao MD, FACC    CC  Jimmie Won Kennedy,   [address] [place] [address]

## 2018-09-26 NOTE — TELEPHONE ENCOUNTER
Notes Recorded by Won Thao MD on 9/16/2018 at 5:05 PM  No symptomatic bradycardia.  He has some VT, although the episode labeled VT at 260 bpm is artifact, not VT.  He does have some prolonged  VT at a moderate rate, which is not symptomatic. He is at risk of sudden death.  He has a fatalistic approach and I don't believe he will want to do further investigation or treatment, but at least we should offer to discuss further if he is willing.  ------------------------------------------------------------------------------------------------------------------------    Called patient. Reviewed results with patient and wife. Patient agreed to do testing. However, patient stated he had a bad reaction to having an nuclear stress test. Messaged Dr. Thao to review. Order Echo, CTa, Calcium Score or CMRI? TGarbmaribell LAYNE

## 2018-09-27 NOTE — TELEPHONE ENCOUNTER
Zio patch monitor results reviewed.  He does not have significant bradycardia.  He pressed the button for symptoms several times, the associated rhythm was always normal.  He has several runs of VT recorded.  This makes me more concerned that he may have a recurrence of his cardiomyopathy.  I offered to pursue this with an echo and nuclear stress test but he declined both.  He does not have significant symptoms at this time.  I would still advise these studies be done, but up to him of course.  I will be happy to see him again if he wishes, but without further evaluation I have nothing more to offer.

## 2018-10-03 NOTE — TELEPHONE ENCOUNTER
Called patient, patient was not available. I left patient a message to return my call. Echo and CT angio of coronary recommended.  Charlie LAYNE

## 2018-10-22 NOTE — TELEPHONE ENCOUNTER
"Ion's wife Mercedes is calling with concerns for Ion today.  She states he caught a bug on 10/3. He had diarrhea for 4-5 days.  He now had a swollen abdomen and his legs are swollen.  He is somewhat weak.  Pulse runs from  within seconds. Did not take it today. Has been taking antibiotics for two days due to some congestion and spitting up some yellow stuff.  Had the antibiotics on hand.    NURSING ASSESSMENT:  Description:  Diarrhea, abd swollen, legs swollen  Onset/duration:  \"a whille\"  Precip. factors:  COPD stage 4. Hx CA, on O2,  Last exam/Treatment:  8/27  Allergies:   Allergies   Allergen Reactions     Ativan      Benzyl Alcohol      Bupropion Hcl Hives and Swelling     Wellbutrin.  THROAT SWELLING.     Haldol [Butyrophenones]      Possible allergy     Levaquin [Quinolones] Swelling     ANGIOEDEMA, THROAT SWELLING RESULTING IN INTUBATION.     Medrol [Methylprednisolone]      Possible allergy         NURSING PLAN: Routed to provider Yes    RECOMMENDED DISPOSITION:  To ED, another person to drive - Mercedes states Ion will not go to the ED. They are requesting to see Dr Kennedy today or tomorrow.  Due to complexity of patient RN will forward to PCP for recommendation.   Will comply with recommendation: Yes  If further questions/concerns or if symptoms do not improve, worsen or new symptoms develop, call your PCP or [facility] as soon as possible.      Guideline used:  Telephone Triage Protocols for Nurses, Fifth Edition, Ana Mercer RN    "

## 2018-10-22 NOTE — TELEPHONE ENCOUNTER
Based on the information given and the patient's concurrent medical concerns, I would recommend that he goes to the emergency department immediately.  The clinic is not equipped for this level of acuity.  If he comes to the clinic, I will tell him to go to the emergency department.    Marcia

## 2018-10-24 NOTE — TELEPHONE ENCOUNTER
Patient did not return my call...      Called patient. Patient was available. Reviewed recommendations. Patient agreed to have an echo and CT angio of coronary. Orders placed. Patient was informed that scheduling will contact him to schedule. Charlie LAYNE

## 2018-10-25 NOTE — PROGRESS NOTES
CTa of coronary was ordered. Patient does not want to go to [facility]. [facility] in [place] does not perform them. Patient would like to go to [facility].     Called [facility] in [place]. Phone: [phone]. They do perform CTa of coronary. We can faxed them the order, they will send it to patient's insurance, if the procedure is authorized, they will contact patient to schedule. Fax: [phone].     Messaged Dr. Thoa to review.     Charlie LAYNE

## 2018-10-25 NOTE — PROGRESS NOTES
Called patient. Patient was informed that he may have his CTa of coronary at [place]. Patient was informed that [facility] in [place] will contact him to schedule. Patient had no questions. Scheduling will fax order to [facility]. Requested results be faxed to [facility]. Charlie LAYNE

## 2018-10-26 NOTE — ED NOTES
DATE:  10/26/2018   TIME OF RECEIPT FROM LAB:  7759  LAB TEST:  Troponin  LAB VALUE:  0.144  RESULTS GIVEN WITH READ-BACK TO (PROVIDER):  Dipti Severino*  TIME LAB VALUE REPORTED TO PROVIDER:   1521

## 2018-10-26 NOTE — ED TRIAGE NOTES
Pt comes in via EMS for complaints of SOB. Pt received a duo-neb and albuterol neb en route. Pt has had an increase in SOB over the last few days. Pts home O2 was increased in the last few days.

## 2018-10-26 NOTE — ED PROVIDER NOTES
History     Chief Complaint   Patient presents with     Shortness of Breath     The history is provided by the patient and medical records.     This is an 78-year-old male with history of COPD, CHF, hypertension, hyperlipidemia, chronic kidney disease, AAA status post repair in 2007, and bladder cancer, presenting with shortness of breath.  Most of the history is per patient's wife.  Patient is on home O2 at night.  About 3 weeks ago, he started having increasing shortness of breath and using his oxygen daily also.  He has gradually weaned himself up to 5 L.  His wife states that the oxygen delivery company came out and told them they needed to go to the hospital.  He started having diarrhea symptoms daily on 10/9.  He has had occasional right red blood per rectum on 10/13 and on 10/16.  His wife has noted that he has had significant decrease in oral intake.  On 10/19, she noted that his legs started to be puffy and swollen.  He also started a [employer] on 10/19 that that he has available at home if needed.  No fevers.  He denies any pain.  He acknowledges fullness and has not been eating much.  He has not vomited and denies nausea.  No chest pain but has significant dyspnea.  He has had decreased urine output.  His wife notes that he has only urinated twice per day for the last week.  No new or change in medications.  He has been using his inhalers and nebulizer as prescribed.  He has had some issues with slow heart rate and palpitations and had a CO patch placed recently.  He has an echocardiogram scheduled in 4 days.  Patient was brought in by paramedics.  He received 2 nebulizer treatments on the way in and was noted to have O2 sats at 89%.  They were unable to place O2 per facemask because of patient's anxiety.    Problem List:    Patient Active Problem List    Diagnosis Date Noted     Chronic airway obstruction (H) 06/23/2004     Priority: High     Problem list name updated by automated process. Provider to  review       Anxiety 05/02/2017     Priority: Medium     CHF (congestive heart failure) (H) 09/17/2013     Priority: Medium     Advanced directives, counseling/discussion 11/02/2012     Priority: Medium     .Discussed advance care planning with patient; information given to patient to review. 11/2/2012          Hypertension goal BP (blood pressure) < 140/90 06/24/2011     Priority: Medium     HYPERLIPIDEMIA LDL GOAL <100 10/31/2010     Priority: Medium     Hypertrophy of prostate without urinary obstruction 11/15/2003     Priority: Medium     Problem list name updated by automated process. Provider to review       CKD (chronic kidney disease) stage 3, GFR 30-59 ml/min (H) 06/24/2011     Priority: Low        Past Medical History:    Past Medical History:   Diagnosis Date     Abdominal aneurysm without mention of rupture 2007     Acute respiratory failure (H) 06/26/08     Anemia      BPH (benign prostatic hypertrophy)      CAD (coronary artery disease) 9/13/2011     Chronic airway obstruction, not elsewhere classified      CKD (chronic kidney disease) stage 3, GFR 30-59 ml/min (H)      Hyperlipidemia      Nasal septal perforation      Nonischemic cardiomyopathy (H)      Obstructive chronic bronchitis with exacerbation (H) 07/04/08     Personal history of peptic ulcer disease      Pulmonary hypertension (H) echo 2/10     Thrombocytopenia (H)      Thrombocytopenia (H) 6/24/2011     Unspecified essential hypertension        Past Surgical History:    Past Surgical History:   Procedure Laterality Date     AAA REPAIR  10/01/2007     COLONOSCOPY  10/12/2011    Procedure:COMBINED COLONOSCOPY, SINGLE BIOPSY/POLYPECTOMY BY BIOPSY; Colonoscopy, Polypectomy by Biopsy; Surgeon:MARK ALVAREZ; Location:[facility]      CYSTOURETHROSCOPY        UGI ENDOSCOPY DIAG W OR W/O BRUSH/WASH  1991     HC VASECTOMY UNILAT/BILAT W POSTOP SEMEN      Vasectomy     LAPAROSCOPIC APPENDECTOMY  6/24/2011    Procedure:LAPAROSCOPIC APPENDECTOMY;  Surgeon:WOODROW MORENO; Location:[facility]       Family History:    Family History   Problem Relation Age of Onset     Respiratory Father      emphysema     Respiratory Brother      emphysema       Social History:  Marital Status:   [2]  Social History   Substance Use Topics     Smoking status: Former Smoker     Packs/day: 1.00     Years: 50.00     Quit date: 8/24/2007     Smokeless tobacco: Never Used      Comment: Quit      Alcohol use No        Medications:      ALPRAZolam (XANAX) 0.25 MG tablet   BROVANA 15 MCG/2ML NEBU neb solution   budesonide (PULMICORT) 1 MG/2ML SUSP neb solution   Coenzyme Q10 (COQ-10 PO)   COMBIVENT RESPIMAT  MCG/ACT inhaler   diltiazem (CARDIZEM) 120 MG tablet   lisinopril (PRINIVIL/ZESTRIL) 5 MG tablet   MULTI-VITAMIN OR   multivitamin, therapeutic with minerals (MULTI-VITAMIN) TABS tablet   terazosin (HYTRIN) 2 MG capsule   tiotropium (SPIRIVA HANDIHALER) 18 MCG capsule   BETA BLOCKER NOT PRESCRIBED, INTENTIONAL,   order for DME   Placebo (ORDER FOR DME)         Review of Systems   All other ROS reviewed and are negative or non-contributory except as stated in HPI.     Physical Exam   BP: 124/70  Heart Rate: 126  Temp: 97.4  F (36.3  C)  Resp: 28  SpO2: 95 %      Physical Exam   Constitutional: He appears well-developed.   Thin, uncomfortable appearing male sitting forward in the bed, tripoding.  O2 per nasal cannula.   HENT:   Head: Normocephalic.   Nose: Nose normal.   Slightly tacky mucous membranes   Eyes: Conjunctivae and EOM are normal.   Neck: Normal range of motion. Neck supple.   Cardiovascular: Regular rhythm, normal heart sounds and intact distal pulses.    Tachycardia   Pulmonary/Chest:   Unable to ascertain breath sounds on the right.  Tachypnea.  Very tight breath sounds on the left with possible occasional crackles.   Abdominal: Soft. There is no tenderness.   Musculoskeletal: Normal range of motion.   Bilateral lower extremity pitting edema to the knees.   No calf tenderness.   Neurological: He is alert. He exhibits normal muscle tone.   Skin: Skin is warm and dry. He is not diaphoretic.   Psychiatric: His behavior is normal.   Anxious   Vitals reviewed.      ED Course (with Medical Decision Making)    Pt seen and examined by me.  RN and EPIC notes reviewed.      Patient with significant dyspnea/shortness of breath, tachycardia, lower extremity edema, decreased breath sounds.  He has also had increased oxygen needs and diarrhea for the last 2-3 weeks.  Concern for underlying COPD, CHF, pneumonia, other infectious process, dehydration with electrolyte abnormalities, PE, numerous other possibilities.  RT available when patient arrived.  He was given a DuoNeb with no improvement.  Patient has significant claustrophobia and is refusing any O2 per facemask.  Would like to try BiPAP.    IV placed, EKG, labs, portable chest x-ray.  Gentle fluid bolus because of the tachycardia.    No change in tachycardia with the fluid bolus.  Chest x-ray does show some evidence of perivascular congestion.  This was discussed with radiology.  Will try Lasix IV.    Was able to give the patient his usual dose of Xanax and a little Benadryl and he accepted the BiPAP.  O2 sat significantly improved.  Some slowing of heart rate.    Labs have been returning with significant abnormalities including potassium of 6.8, BUN/creatinine 144/5.7, elevated glucose.  This was all rechecked and is validated with the second draw.  Troponin elevated likely from the renal failure.  BNP also slightly elevated.  VBG as below.  D-dimer is high, but I am not going to pursue this at this time.    Patient was given Decadron for possible COPD exacerbation.  I am not going to give him any antibiotics.  He was given calcium for the hyperkalemia and has received the Lasix.  Family would like patient to be transferred to [facility].  I spoke with the hospitalist.  We are also going to add insulin and dextrose.   "Recheck VBG and potassium.    Patient noted to have some hypotension.  This mostly was when he was lying on his side and sleeping.  With a decreasing blood pressures, he was given small boluses of fluids.  With repositioning and continued monitoring, pressures did trend towards normalizing.  Patient was noted to have very frequent PVCs.  There was some delay in getting the insulin and dextrose because of other critical patients in the ED.  We did recheck the potassium somewhat early after receiving insulin and dextrose, but it is actually unchanged.  With the frequent PVCs, hypotension, and continued elevation of potassium, he was given a second dose of insulin.  First dose was 5 units, second dose 5 units.  This is actually quite low but according to the \"protocol\".  He also received another dose of calcium gluconate prior to transfer.    Patient refused Mills catheter in the ED.    At the time of his transfer, patient continued on BiPAP, fluids, and vital signs were improving.  Final EKG was done which shows possible atrial fibrillation/flutter.  He was transported via EMS to [facility].     Procedures     Initial EKG was done and shows heart rate of 127.  There is a lot of artifact.  It appears that patient is in sinus tachycardia but again, difficult to read.    Second EKG was done and read at 1603.  Again, sinus tachycardia.  There is incomplete right bundle branch block, right axis, nonspecific T wave abnormality.    Critical Care time:  was 90 minutes for this patient excluding procedures.    Results for orders placed or performed during the hospital encounter of 10/26/18 (from the past 24 hour(s))   CBC with platelets differential   Result Value Ref Range    WBC 9.2 4.0 - 11.0 10e9/L    RBC Count 3.65 (L) 4.4 - 5.9 10e12/L    Hemoglobin 11.8 (L) 13.3 - 17.7 g/dL    Hematocrit 36.7 (L) 40.0 - 53.0 %     (H) 78 - 100 fl    MCH 32.3 26.5 - 33.0 pg    MCHC 32.2 31.5 - 36.5 g/dL    RDW 15.1 (H) " 10.0 - 15.0 %    Platelet Count 136 (L) 150 - 450 10e9/L    Diff Method Automated Method     % Neutrophils 81.6 %    % Lymphocytes 10.3 %    % Monocytes 6.4 %    % Eosinophils 0.5 %    % Basophils 0.3 %    % Immature Granulocytes 0.9 %    Nucleated RBCs 0 0 /100    Absolute Neutrophil 7.5 1.6 - 8.3 10e9/L    Absolute Lymphocytes 1.0 0.8 - 5.3 10e9/L    Absolute Monocytes 0.6 0.0 - 1.3 10e9/L    Absolute Basophils 0.0 0.0 - 0.2 10e9/L    Abs Immature Granulocytes 0.1 0 - 0.4 10e9/L    Absolute Nucleated RBC 0.0    Comprehensive metabolic panel   Result Value Ref Range    Sodium 138 133 - 144 mmol/L    Potassium 6.8 (HH) 3.4 - 5.3 mmol/L    Chloride 102 94 - 109 mmol/L    Carbon Dioxide 28 20 - 32 mmol/L    Anion Gap 8 3 - 14 mmol/L    Glucose 213 (H) 70 - 99 mg/dL    Urea Nitrogen 144 (H) 7 - 30 mg/dL    Creatinine 5.71 (H) 0.66 - 1.25 mg/dL    GFR Estimate 10 (L) >60 mL/min/1.7m2    GFR Estimate If Black 12 (L) >60 mL/min/1.7m2    Calcium 8.0 (L) 8.5 - 10.1 mg/dL    Bilirubin Total 0.4 0.2 - 1.3 mg/dL    Albumin 3.2 (L) 3.4 - 5.0 g/dL    Protein Total 6.4 (L) 6.8 - 8.8 g/dL    Alkaline Phosphatase 66 40 - 150 U/L    ALT 51 0 - 70 U/L    AST 21 0 - 45 U/L   Magnesium   Result Value Ref Range    Magnesium 2.7 (H) 1.6 - 2.3 mg/dL   Troponin I   Result Value Ref Range    Troponin I ES 0.144 (HH) 0.000 - 0.045 ug/L   TSH with free T4 reflex   Result Value Ref Range    TSH 2.19 0.40 - 4.00 mU/L   Nt probnp inpatient   Result Value Ref Range    N-Terminal Pro BNP Inpatient 5673 (H) 0 - 1800 pg/mL   Blood gas venous   Result Value Ref Range    Ph Venous 7.18 (LL) 7.32 - 7.43 pH    PCO2 Venous 72 (H) 40 - 50 mm Hg    PO2 Venous 83 (H) 25 - 47 mm Hg    Bicarbonate Venous 27 21 - 28 mmol/L    Base Deficit Venous 3.1 mmol/L    FIO2 44    D dimer quantitative   Result Value Ref Range    D Dimer 9.0 (H) 0.0 - 0.50 ug/ml FEU   XR Chest Port 1 View    Narrative    CHEST PORTABLE ONE VIEW   10/26/2018 2:47 PM     HISTORY: Shortness  of breath.     COMPARISON: Chest x-rays dated 6/7/2016.    FINDINGS:  Lungs are persistently hyperaerated but are less aerated  than on the prior study. There are small bilateral pleural fluid  collections. Patchy opacity in the right base likely represents  atelectasis. Mild curly B lines are noted in the right base and to a  lesser extent in the left base. Pulmonary vascularity is otherwise  unremarkable. No definite cephalization is seen. No pneumothorax is  identified. Patient's chin partially obscures the apices. No obvious  fracture is seen.    The cardiac silhouette appears upper normal size or mildly enlarged,  even given technique. There is an upturned left ventricular apex which  could represent left ventricular hypertrophy.      Impression    IMPRESSION:  1. Findings are concerning for mild congestive heart failure with  probable adjacent compressive atelectasis in the lung bases. A  superimposed right basilar infiltrate is not excluded.  2. Underlying chronic obstructive pulmonary disease is considered  likely.    I discussed these findings with Dr. Severino on 10/26/2018 at 3:05 PM.    MICHELET TYLER MD   Basic metabolic panel   Result Value Ref Range    Sodium 138 133 - 144 mmol/L    Potassium 6.8 (HH) 3.4 - 5.3 mmol/L    Chloride 102 94 - 109 mmol/L    Carbon Dioxide 28 20 - 32 mmol/L    Anion Gap 8 3 - 14 mmol/L    Glucose 198 (H) 70 - 99 mg/dL    Urea Nitrogen 143 (H) 7 - 30 mg/dL    Creatinine 5.73 (H) 0.66 - 1.25 mg/dL    GFR Estimate 10 (L) >60 mL/min/1.7m2    GFR Estimate If Black 12 (L) >60 mL/min/1.7m2    Calcium 7.9 (L) 8.5 - 10.1 mg/dL   Blood gas venous   Result Value Ref Range    Ph Venous 7.21 (L) 7.32 - 7.43 pH    PCO2 Venous 66 (H) 40 - 50 mm Hg    PO2 Venous 36 25 - 47 mm Hg    Bicarbonate Venous 27 21 - 28 mmol/L    Base Deficit Venous 2.5 mmol/L    FIO2 30%    Glucose by meter   Result Value Ref Range    Glucose 165 (H) 70 - 99 mg/dL   Glucose by meter   Result Value Ref Range     Glucose 155 (H) 70 - 99 mg/dL   Glucose by meter   Result Value Ref Range    Glucose 224 (H) 70 - 99 mg/dL   Potassium   Result Value Ref Range    Potassium 6.9 (HH) 3.4 - 5.3 mmol/L   Glucose by meter   Result Value Ref Range    Glucose 194 (H) 70 - 99 mg/dL   Glucose by meter   Result Value Ref Range    Glucose 165 (H) 70 - 99 mg/dL       Medications   sodium chloride (PF) 0.9% PF flush 3 mL (not administered)   lidocaine 2 % (URO-JET) jelly 20 mL (not administered)   dextrose 10% infusion ( Intravenous ED Infusing on Admission/transfer 10/26/18 1956)   glucose gel 15-30 g (not administered)     Or   dextrose 50 % injection 25-50 mL (not administered)     Or   glucagon injection 1 mg (not administered)   sodium chloride 0.9% infusion (0 mLs Intravenous ED Infusing on Admission/transfer 10/26/18 1956)   ipratropium - albuterol 0.5 mg/2.5 mg/3 mL (DUONEB) 0.5-2.5 (3) MG/3ML neb solution (  Given 10/26/18 1455)   0.9% sodium chloride BOLUS (0 mLs Intravenous Stopped 10/26/18 1630)   dexamethasone PF (DECADRON) injection 10 mg (10 mg Intravenous Given 10/26/18 1507)   furosemide (LASIX) injection 20 mg (20 mg Intravenous Given 10/26/18 1509)   diphenhydrAMINE (BENADRYL) injection 25 mg (25 mg Intravenous Given 10/26/18 1539)   ALPRAZolam (XANAX) tablet 0.5 mg (0.5 mg Oral Given 10/26/18 1539)   calcium chloride injection 1 g (1 g Intravenous Given 10/26/18 1542)   sodium bicarbonate 8.4 % injection 50 mEq (50 mEq Intravenous Given 10/26/18 1819)   dextrose 50 % injection 25 g (25 g Intravenous Given 10/26/18 1813)   insulin (regular) (HumuLIN R/NovoLIN R) injection 5 Units (5 Units Intravenous Given 10/26/18 1813)   0.9% sodium chloride BOLUS (0 mLs Intravenous Stopped 10/26/18 1836)   insulin (regular) (HumuLIN R/NovoLIN R) injection 5 Units (5 Units Intravenous Given 10/26/18 1947)   calcium gluconate 10 % injection 1 g (0 g Intravenous Stopped 10/26/18 1955)       Assessments & Plan   I have reviewed the findings,  diagnosis, plan with the patient and wife  Discharge Medication List as of 10/26/2018  8:14 PM          Final diagnoses:   Acute respiratory failure with hypercapnia (H)   Acute renal failure, unspecified acute renal failure type (H)   Hyperkalemia   Tachycardia     Disposition: Patient transferred to [facility] in stable condition.    Note: Chart documentation done in part with Dragon Voice Recognition software. Although reviewed after completion, some word and grammatical errors may remain.     10/26/2018   [facility] EMERGENCY DEPARTMENT     Dipti Severino MD  10/26/18 2041

## 2018-11-01 LAB
BACTERIA SPEC CULT: NORMAL
BACTERIA SPEC CULT: NORMAL
Lab: NORMAL
Lab: NORMAL
SPECIMEN SOURCE: NORMAL
SPECIMEN SOURCE: NORMAL

## 2018-11-15 ENCOUNTER — CARE COORDINATION (OUTPATIENT)
Dept: CARDIOLOGY | Facility: CLINIC | Age: 78
End: 2018-11-15

## 2024-06-04 NOTE — ED NOTES
Addended by: DOV RODRIGUEZ on: 6/4/2024 09:07 AM     Modules accepted: Orders     Called Peter LAYNE at [facility] with an update and that Ion was on his way.    oral